# Patient Record
Sex: MALE | Race: WHITE | NOT HISPANIC OR LATINO | Employment: UNEMPLOYED | URBAN - METROPOLITAN AREA
[De-identification: names, ages, dates, MRNs, and addresses within clinical notes are randomized per-mention and may not be internally consistent; named-entity substitution may affect disease eponyms.]

---

## 2017-08-07 ENCOUNTER — HOSPITAL ENCOUNTER (OUTPATIENT)
Facility: HOSPITAL | Age: 72
Discharge: HOME | End: 2017-08-08
Admitting: INTERNAL MEDICINE
Payer: MEDICARE

## 2017-08-07 DIAGNOSIS — R55 SYNCOPE AND COLLAPSE: ICD-10-CM

## 2017-08-07 DIAGNOSIS — I10 HYPERTENSION, ESSENTIAL: Primary | ICD-10-CM

## 2017-08-07 DIAGNOSIS — R07.9 CHEST PAIN: ICD-10-CM

## 2017-08-07 PROBLEM — Z91.89 AT RISK FOR ABNORMAL BLOOD GLUCOSE LEVEL: Status: ACTIVE | Noted: 2017-08-07

## 2017-08-07 PROBLEM — R51.9 HEADACHE: Status: ACTIVE | Noted: 2017-08-07

## 2017-08-07 LAB
ALBUMIN SERPL BCP-MCNC: 3.9 G/DL
ALP SERPL-CCNC: 163 U/L
ALT SERPL W/O P-5'-P-CCNC: 39 U/L
ANION GAP SERPL CALC-SCNC: 10 MMOL/L
AST SERPL-CCNC: 31 U/L
BASOPHILS # BLD AUTO: 0.03 K/UL
BASOPHILS NFR BLD: 0.3 %
BILIRUB SERPL-MCNC: 0.6 MG/DL
BNP SERPL-MCNC: 13 PG/ML
BUN SERPL-MCNC: 16 MG/DL
CALCIUM SERPL-MCNC: 9.9 MG/DL
CHLORIDE SERPL-SCNC: 107 MMOL/L
CHOLEST/HDLC SERPL: 6.2 {RATIO}
CO2 SERPL-SCNC: 22 MMOL/L
CREAT SERPL-MCNC: 1 MG/DL
DIASTOLIC DYSFUNCTION: NO
DIFFERENTIAL METHOD: ABNORMAL
EOSINOPHIL # BLD AUTO: 0.1 K/UL
EOSINOPHIL NFR BLD: 1 %
ERYTHROCYTE [DISTWIDTH] IN BLOOD BY AUTOMATED COUNT: 12.9 %
EST. GFR  (AFRICAN AMERICAN): >60 ML/MIN/1.73 M^2
EST. GFR  (NON AFRICAN AMERICAN): >60 ML/MIN/1.73 M^2
ESTIMATED PA SYSTOLIC PRESSURE: 16.65
GLOBAL PERICARDIAL EFFUSION: NORMAL
GLUCOSE SERPL-MCNC: 146 MG/DL
HCT VFR BLD AUTO: 46.9 %
HDL/CHOLESTEROL RATIO: 16.2 %
HDLC SERPL-MCNC: 173 MG/DL
HDLC SERPL-MCNC: 28 MG/DL
HGB BLD-MCNC: 16.1 G/DL
LDLC SERPL CALC-MCNC: 114.4 MG/DL
LIPASE SERPL-CCNC: 30 U/L
LYMPHOCYTES # BLD AUTO: 1.1 K/UL
LYMPHOCYTES NFR BLD: 11.7 %
MCH RBC QN AUTO: 31.7 PG
MCHC RBC AUTO-ENTMCNC: 34.3 G/DL
MCV RBC AUTO: 92 FL
MITRAL VALVE MOBILITY: NORMAL
MONOCYTES # BLD AUTO: 0.6 K/UL
MONOCYTES NFR BLD: 6.2 %
NEUTROPHILS # BLD AUTO: 7.5 K/UL
NEUTROPHILS NFR BLD: 80.8 %
NONHDLC SERPL-MCNC: 145 MG/DL
PLATELET # BLD AUTO: 200 K/UL
PMV BLD AUTO: 9.9 FL
POCT GLUCOSE: 130 MG/DL (ref 70–110)
POCT GLUCOSE: 154 MG/DL (ref 70–110)
POTASSIUM SERPL-SCNC: 4.4 MMOL/L
PROT SERPL-MCNC: 7.7 G/DL
RBC # BLD AUTO: 5.08 M/UL
RETIRED EF AND QEF - SEE NOTES: 55 (ref 55–65)
SODIUM SERPL-SCNC: 139 MMOL/L
TRIGL SERPL-MCNC: 153 MG/DL
TROPONIN I SERPL DL<=0.01 NG/ML-MCNC: 0.01 NG/ML
TROPONIN I SERPL DL<=0.01 NG/ML-MCNC: <0.006 NG/ML
TROPONIN I SERPL DL<=0.01 NG/ML-MCNC: <0.006 NG/ML
WBC # BLD AUTO: 9.3 K/UL

## 2017-08-07 PROCEDURE — 25000003 PHARM REV CODE 250

## 2017-08-07 PROCEDURE — A4216 STERILE WATER/SALINE, 10 ML: HCPCS

## 2017-08-07 PROCEDURE — G0378 HOSPITAL OBSERVATION PER HR: HCPCS

## 2017-08-07 PROCEDURE — 85025 COMPLETE CBC W/AUTO DIFF WBC: CPT

## 2017-08-07 PROCEDURE — 93306 TTE W/DOPPLER COMPLETE: CPT

## 2017-08-07 PROCEDURE — 36415 COLL VENOUS BLD VENIPUNCTURE: CPT

## 2017-08-07 PROCEDURE — 96372 THER/PROPH/DIAG INJ SC/IM: CPT

## 2017-08-07 PROCEDURE — 84484 ASSAY OF TROPONIN QUANT: CPT

## 2017-08-07 PROCEDURE — 93306 TTE W/DOPPLER COMPLETE: CPT | Mod: 26,,, | Performed by: INTERNAL MEDICINE

## 2017-08-07 PROCEDURE — 63600175 PHARM REV CODE 636 W HCPCS: Performed by: NURSE PRACTITIONER

## 2017-08-07 PROCEDURE — 82962 GLUCOSE BLOOD TEST: CPT

## 2017-08-07 PROCEDURE — 83880 ASSAY OF NATRIURETIC PEPTIDE: CPT

## 2017-08-07 PROCEDURE — 25000003 PHARM REV CODE 250: Performed by: NURSE PRACTITIONER

## 2017-08-07 PROCEDURE — 99285 EMERGENCY DEPT VISIT HI MDM: CPT | Mod: 25

## 2017-08-07 PROCEDURE — 93005 ELECTROCARDIOGRAM TRACING: CPT

## 2017-08-07 PROCEDURE — 84484 ASSAY OF TROPONIN QUANT: CPT | Mod: 91

## 2017-08-07 PROCEDURE — 99220 PR INITIAL OBSERVATION CARE,LEVL III: CPT | Mod: ,,, | Performed by: INTERNAL MEDICINE

## 2017-08-07 PROCEDURE — 80061 LIPID PANEL: CPT

## 2017-08-07 PROCEDURE — 80053 COMPREHEN METABOLIC PANEL: CPT

## 2017-08-07 PROCEDURE — 83690 ASSAY OF LIPASE: CPT

## 2017-08-07 RX ORDER — ACETAMINOPHEN 325 MG/1
650 TABLET ORAL EVERY 6 HOURS PRN
Status: DISCONTINUED | OUTPATIENT
Start: 2017-08-07 | End: 2017-08-08 | Stop reason: HOSPADM

## 2017-08-07 RX ORDER — ACETAMINOPHEN 500 MG
1000 TABLET ORAL
Status: COMPLETED | OUTPATIENT
Start: 2017-08-07 | End: 2017-08-07

## 2017-08-07 RX ORDER — GABAPENTIN 300 MG/1
600 CAPSULE ORAL 3 TIMES DAILY
COMMUNITY

## 2017-08-07 RX ORDER — IBUPROFEN 400 MG/1
800 TABLET ORAL EVERY 6 HOURS PRN
Status: DISCONTINUED | OUTPATIENT
Start: 2017-08-07 | End: 2017-08-08 | Stop reason: HOSPADM

## 2017-08-07 RX ORDER — GABAPENTIN 300 MG/1
300 CAPSULE ORAL 3 TIMES DAILY
Status: DISCONTINUED | OUTPATIENT
Start: 2017-08-07 | End: 2017-08-08 | Stop reason: HOSPADM

## 2017-08-07 RX ORDER — BUTALBITAL, ACETAMINOPHEN AND CAFFEINE 50; 325; 40 MG/1; MG/1; MG/1
1 TABLET ORAL EVERY 6 HOURS PRN
Status: DISCONTINUED | OUTPATIENT
Start: 2017-08-07 | End: 2017-08-08 | Stop reason: HOSPADM

## 2017-08-07 RX ORDER — SODIUM CHLORIDE 0.9 % (FLUSH) 0.9 %
3 SYRINGE (ML) INJECTION EVERY 8 HOURS
Status: DISCONTINUED | OUTPATIENT
Start: 2017-08-07 | End: 2017-08-08 | Stop reason: HOSPADM

## 2017-08-07 RX ORDER — IBUPROFEN 400 MG/1
800 TABLET ORAL EVERY 6 HOURS PRN
Status: DISCONTINUED | OUTPATIENT
Start: 2017-08-07 | End: 2017-08-07

## 2017-08-07 RX ORDER — LISINOPRIL 20 MG/1
40 TABLET ORAL DAILY
Status: DISCONTINUED | OUTPATIENT
Start: 2017-08-08 | End: 2017-08-08 | Stop reason: HOSPADM

## 2017-08-07 RX ORDER — LISINOPRIL 40 MG/1
40 TABLET ORAL DAILY
COMMUNITY

## 2017-08-07 RX ORDER — NITROGLYCERIN 0.4 MG/1
0.4 TABLET SUBLINGUAL EVERY 5 MIN PRN
Status: DISCONTINUED | OUTPATIENT
Start: 2017-08-07 | End: 2017-08-08 | Stop reason: HOSPADM

## 2017-08-07 RX ORDER — ENOXAPARIN SODIUM 100 MG/ML
40 INJECTION SUBCUTANEOUS EVERY 24 HOURS
Status: DISCONTINUED | OUTPATIENT
Start: 2017-08-07 | End: 2017-08-08 | Stop reason: HOSPADM

## 2017-08-07 RX ADMIN — ACETAMINOPHEN 1000 MG: 500 TABLET ORAL at 11:08

## 2017-08-07 RX ADMIN — GABAPENTIN 300 MG: 300 CAPSULE ORAL at 09:08

## 2017-08-07 RX ADMIN — BUTALBITAL, ACETAMINOPHEN AND CAFFEINE 1 TABLET: 50; 325; 40 TABLET ORAL at 05:08

## 2017-08-07 RX ADMIN — ENOXAPARIN SODIUM 40 MG: 100 INJECTION SUBCUTANEOUS at 05:08

## 2017-08-07 RX ADMIN — SODIUM CHLORIDE, PRESERVATIVE FREE 3 ML: 5 INJECTION INTRAVENOUS at 09:08

## 2017-08-07 NOTE — HPI
"71 y.o. male with history including HTN and Hard of hearing. Per ED doc report the patient has had multiple recurrent syncopal episodes to the point where he damaged his glasses. Most recent today, in early morning he felt woozier went to the bathroom to have a bowel movement after which he stood up and felt like a dimmer switch went off.   He has had multiple episodes. Once several months ago whereas he lost consciousness. He saw his primary care doctor who thought he had a stomach virus.He denies confusion, foaming at the mouth, fever, chills, changes in bowel or bladder functions, sick contacts, or long trips. Denies history of smoking, father  of heart attack. Patient had neck surgery after crushed vertebra fall at work .    On my interveiw the pt describes several epsiodes of LOC, last occurring approx 2 months ago (which sounds orthostatic in nature).  More recently has had had abd bloating and gas with associated feeling of "a dimmer switch being turned off."  He is currently asymptomatic.  He did not pass out prior to this admission.  "

## 2017-08-07 NOTE — ED NOTES
JESSICA Avalos notified that pt. Refused MRI. NP ordered ativan, pt. Refused ativan and MRI. Pt. Stated that he would rather not have the procedure. NP instructed RN to cancel MRI if pt. Refused.
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
## 2017-08-07 NOTE — HPI
Ramesh Purcell is a 71 y.o. male with history including HTN and Hard of hearing. Per ED doc report the patient has had multiple recurrent syncopal episodes to the point where he damaged his glasses. Most recent today, in early morning he felt woozier went to the bathroom to have a bowel movement after which he stood up and felt like a dimmer switch went off.     He has had multiple episodes. Once several months ago whereas he lost consciousness. He saw his primary care doctor who thought he had a stomach virus.He denies confusion, foaming at the mouth, fever, chills, changes in bowel or bladder functions, sick contacts, or long trips. Denies history of smoking, father  of heart attack. Patient had neck surgery after crushed vertebra fall at work .

## 2017-08-07 NOTE — ASSESSMENT & PLAN NOTE
Basic labs mostly unrevealing except for glucose 146 and alk phos 163 - his initial troponin 1 level  Is normal, BNP negative; CT head and CXR negative. Kidney functions normal, AST/ALT normal -   -IP consult to cardiology r/o arrythmias  -Continuous cardiac monitoring  -trend troponin 1 levels  -Orthostatic BP  -Neuro checks  -2d echo  -MRA head & neck  -Cardiology consult r/o arrythmias  -Neurology consult r/o compression
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
## 2017-08-07 NOTE — ASSESSMENT & PLAN NOTE
Basic labs mostly unrevealing except for glucose 146 and alk phos 163 - his initial troponin 1 level  Is normal, BNP negative; CT head and CXR negative. Kidney functions normal, AST/ALT normal -   -IP consult to cardiology r/o arrythmias  -Continuous cardiac monitoring  -trend troponin 1 levels  -Orthostatic BP  -Neuro checks  -2d echo  -us carotid  -Cardiology consult r/o arrythmias  -Neurology consult r/o compression
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
## 2017-08-07 NOTE — PROVIDER PROGRESS NOTES - EMERGENCY DEPT.
Encounter Date: 8/7/2017    ED Physician Progress Notes        Physician Note:   Chest x-ray interpreted by me without acute infiltrates no cardiomegaly
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
## 2017-08-07 NOTE — ED TRIAGE NOTES
C/o generalized abd pain that started in the epigastric region and moved downward. Relieved with gas and having a BM. States this has been going on for months, intermittent. He feels weak/dizzy when the pain is the worst. Syncopal episode a few weeks ago.

## 2017-08-07 NOTE — PROVIDER PROGRESS NOTES - EMERGENCY DEPT.
Encounter Date: 8/7/2017    ED Physician Progress Notes         Bailey Scott observation consult cardiology

## 2017-08-07 NOTE — ASSESSMENT & PLAN NOTE
Basic labs mostly unrevealing except for glucose 146 and alk phos 163 - his initial troponin 1 level  Is normal, BNP negative; CT head and CXR negative. Kidney functions normal, AST/ALT normal  -IP consult to cardiology r/o arrythmias  -Continuous cardiac monitoring  -trend troponin 1 levels  -Orthostatic BP  -Neuro checks  -2d echo

## 2017-08-08 VITALS
DIASTOLIC BLOOD PRESSURE: 70 MMHG | BODY MASS INDEX: 36.56 KG/M2 | SYSTOLIC BLOOD PRESSURE: 127 MMHG | HEART RATE: 79 BPM | OXYGEN SATURATION: 95 % | HEIGHT: 71 IN | TEMPERATURE: 98 F | RESPIRATION RATE: 18 BRPM | WEIGHT: 261.13 LBS

## 2017-08-08 LAB
ALBUMIN SERPL BCP-MCNC: 3.7 G/DL
ALP SERPL-CCNC: 145 U/L
ALT SERPL W/O P-5'-P-CCNC: 28 U/L
ANION GAP SERPL CALC-SCNC: 7 MMOL/L
AST SERPL-CCNC: 24 U/L
BASOPHILS # BLD AUTO: 0.04 K/UL
BASOPHILS NFR BLD: 0.6 %
BILIRUB SERPL-MCNC: 0.7 MG/DL
BUN SERPL-MCNC: 16 MG/DL
CALCIUM SERPL-MCNC: 9.6 MG/DL
CHLORIDE SERPL-SCNC: 106 MMOL/L
CO2 SERPL-SCNC: 29 MMOL/L
CREAT SERPL-MCNC: 1.1 MG/DL
DIFFERENTIAL METHOD: ABNORMAL
EOSINOPHIL # BLD AUTO: 0.2 K/UL
EOSINOPHIL NFR BLD: 2.3 %
ERYTHROCYTE [DISTWIDTH] IN BLOOD BY AUTOMATED COUNT: 13 %
EST. GFR  (AFRICAN AMERICAN): >60 ML/MIN/1.73 M^2
EST. GFR  (NON AFRICAN AMERICAN): >60 ML/MIN/1.73 M^2
GLUCOSE SERPL-MCNC: 180 MG/DL
HCT VFR BLD AUTO: 46.8 %
HGB BLD-MCNC: 16 G/DL
LYMPHOCYTES # BLD AUTO: 1.3 K/UL
LYMPHOCYTES NFR BLD: 17.9 %
MCH RBC QN AUTO: 31.9 PG
MCHC RBC AUTO-ENTMCNC: 34.2 G/DL
MCV RBC AUTO: 93 FL
MONOCYTES # BLD AUTO: 0.6 K/UL
MONOCYTES NFR BLD: 8.3 %
NEUTROPHILS # BLD AUTO: 5.1 K/UL
NEUTROPHILS NFR BLD: 70.9 %
PLATELET # BLD AUTO: 186 K/UL
PMV BLD AUTO: 9.9 FL
POCT GLUCOSE: 100 MG/DL (ref 70–110)
POCT GLUCOSE: 140 MG/DL (ref 70–110)
POTASSIUM SERPL-SCNC: 4.2 MMOL/L
PROT SERPL-MCNC: 7.3 G/DL
RBC # BLD AUTO: 5.01 M/UL
SODIUM SERPL-SCNC: 142 MMOL/L
TROPONIN I SERPL DL<=0.01 NG/ML-MCNC: 0.01 NG/ML
WBC # BLD AUTO: 7.25 K/UL

## 2017-08-08 PROCEDURE — 85025 COMPLETE CBC W/AUTO DIFF WBC: CPT

## 2017-08-08 PROCEDURE — 84484 ASSAY OF TROPONIN QUANT: CPT

## 2017-08-08 PROCEDURE — 99226 PR SUBSEQUENT OBSERVATION CARE,LEVEL III: CPT | Mod: ,,, | Performed by: INTERNAL MEDICINE

## 2017-08-08 PROCEDURE — 25000003 PHARM REV CODE 250

## 2017-08-08 PROCEDURE — 80053 COMPREHEN METABOLIC PANEL: CPT

## 2017-08-08 PROCEDURE — A4216 STERILE WATER/SALINE, 10 ML: HCPCS

## 2017-08-08 PROCEDURE — G0378 HOSPITAL OBSERVATION PER HR: HCPCS

## 2017-08-08 PROCEDURE — 36415 COLL VENOUS BLD VENIPUNCTURE: CPT

## 2017-08-08 RX ORDER — ATORVASTATIN CALCIUM 10 MG/1
20 TABLET, FILM COATED ORAL NIGHTLY
Status: DISCONTINUED | OUTPATIENT
Start: 2017-08-08 | End: 2017-08-08 | Stop reason: HOSPADM

## 2017-08-08 RX ORDER — ATORVASTATIN CALCIUM 20 MG/1
20 TABLET, FILM COATED ORAL NIGHTLY
Qty: 30 TABLET | Refills: 1 | Status: SHIPPED | OUTPATIENT
Start: 2017-08-08 | End: 2018-08-08

## 2017-08-08 RX ADMIN — GABAPENTIN 300 MG: 300 CAPSULE ORAL at 06:08

## 2017-08-08 RX ADMIN — SODIUM CHLORIDE, PRESERVATIVE FREE 3 ML: 5 INJECTION INTRAVENOUS at 06:08

## 2017-08-08 RX ADMIN — LISINOPRIL 40 MG: 20 TABLET ORAL at 09:08
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
## 2017-08-08 NOTE — PLAN OF CARE
08/08/17 1400   Discharge Assessment   Assessment Type Discharge Planning Assessment  (pt discharged prior to completion)

## 2017-08-08 NOTE — ASSESSMENT & PLAN NOTE
Sxs seem vagal in nature.  No recurrence overnight.  No events on tele.  He did not actually lose consciousness this time, although he has had LOC in the past (although those episodes seem more orthostatic/dehydration in nature).  No overt anginal sxs.  EKG/Echo/CE normal.  No plan for inpatient stress test  OK for discharge  Pt to follow up with me in the office for outpatient event monitor

## 2017-08-08 NOTE — PLAN OF CARE
Problem: Fall Risk (Adult)  Goal: Absence of Falls  Patient will demonstrate the desired outcomes by discharge/transition of care.    08/08/17 0249   Fall Risk (Adult)   Absence of Falls making progress toward outcome       Problem: Patient Care Overview  Goal: Plan of Care Review   08/08/17 0249   Coping/Psychosocial   Plan Of Care Reviewed With patient;spouse       Problem: Syncope (Adult)  Intervention: Prevent Injury   08/08/17 0249   Cardiac Interventions   Syncope Management position changed slowly       Goal: Identify Related Risk Factors and Signs and Symptoms  Related risk factors and signs and symptoms are identified upon initiation of Human Response Clinical Practice Guideline (CPG)   08/08/17 0249   Syncope   Signs and Symptoms (Syncope) visual disturbance     Goal: Physical Safety/Health Maintenance  Patient will demonstrate the desired outcomes by discharge/transition of care.   08/08/17 0249   Syncope (Adult)   Physical Safety/Health Maintenance making progress toward outcome     Goal: Optimal Emotional/Functional [place]  Patient will demonstrate the desired outcomes by discharge/transition of care.   08/08/17 0249   Syncope (Adult)   Optimal Emotional/Functional [place] making progress toward outcome

## 2017-08-08 NOTE — NURSING
Discharge instructions given to patient and spouse at bedside. Patient verbalized understanding of instructions. Patient states willingness to comply. Saline lock removed. Tele monitoring removed.

## 2017-08-08 NOTE — HOSPITAL COURSE
Interval Hx: no complaints.  No cp/sob.  No further abd bloating or near syncopal sxs.    Tele: NSR (pers rev)

## 2017-08-08 NOTE — PLAN OF CARE
08/08/17 1507   Final Note   Assessment Type Final Discharge Note   Discharge Disposition Home   Discharge planning education complete? Yes   Hospital Follow Up  Appt(s) scheduled? Yes   Discharge plans and expectations educations in teach back method with documentation complete? Yes   Offered [facility] -- Bedside Delivery? n/a   Discharge/Hospital Encounter Summary to (non-[facility]) PCP n/a   Referral to Outpatient Case Management complete? n/a   Referral to / orders for Home Health Complete? n/a   30 day supply of medicines given at discharge, if documented non-compliance / non-adherence? n/a   Any social issues identified prior to discharge? n/a   Did you assess the readiness or willingness of the family or caregiver to support self management of care? n/a   Right Care Referral Info   Post Acute Recommendation No Care     Follow-up Information     ALBERT Darling On 8/11/2017.    Specialty:  Family Medicine  Why:  Appointment scheduled Friday August 11th at 10:15am  Contact information:  [address]  [place] [facility] [address]  [phone]             Stan Moreno MD On 8/17/2017.    Specialties:  Cardiology, INTERVENTIONAL CARDIOLOGY  Why:  Appoinmtent scheduled for Thursday August 17th at 3pm  Contact information:  [address]  [place] [facility] [address]  [phone]

## 2017-08-08 NOTE — ASSESSMENT & PLAN NOTE
Sxs seem vagal in nature.  He did not actually lose consciousness this time, although he has had LOC in the past (although those episodes seem more orthostatic/dehydration in nature).  No overt anginal sxs.  EKG/Echo/CE normal.  Monitor overnight  No plan for inpatient stress test  Assuming no arrhythmia, OK for discharge in am  Pt to follow up with me in the office for outpatient event monitor

## 2017-08-08 NOTE — NURSING
Report received from CIRA Otero. Patient care assumed. Pt. Observed lying in bed wife spouse at the bedside. Pt. Is AAOx4. Pt. Noted with telemetry monitor in place. Pt. Vital signs stable and found in flow sheets with complete patient assessment. Pt. Skin dry and intact. Pt. Has no complaints of pain and no signs of respiratory distress noted. Pt. Stable. Will continue to monitor.

## 2017-08-08 NOTE — SUBJECTIVE & OBJECTIVE
Past Medical History:   Diagnosis Date    Hypertension     Kidney stones     Peripheral neuropathy        Past Surgical History:   Procedure Laterality Date    CERVICAL SPINE SURGERY         Review of patient's allergies indicates:   Allergen Reactions    Codeine Itching       No current facility-administered medications on file prior to encounter.      No current outpatient prescriptions on file prior to encounter.     Family History     Problem Relation (Age of Onset)    Breast cancer Mother    Colon cancer Mother    Heart disease Father (72)        Social History Main Topics    Smoking status: Never Smoker    Smokeless tobacco: Never Used    Alcohol use No    Drug use: No    Sexual activity: Not on file     Review of Systems   Gastrointestinal: Negative for melena.   Genitourinary: Negative for hematuria.     Objective:     Vital Signs (Most Recent):  Temp: 98.9 °F (37.2 °C) (08/08/17 0510)  Pulse: 77 (08/08/17 0510)  Resp: 18 (08/08/17 0510)  BP: 130/70 (08/08/17 0510)  SpO2: 96 % (08/08/17 0510) Vital Signs (24h Range):  Temp:  [97.7 °F (36.5 °C)-99 °F (37.2 °C)] 98.9 °F (37.2 °C)  Pulse:  [75-86] 77  Resp:  [18-20] 18  SpO2:  [95 %-97 %] 96 %  BP: (124-156)/(57-92) 130/70     Weight: 118.4 kg (261 lb 1.6 oz)  Body mass index is 36.93 kg/m².    SpO2: 96 %  O2 Device (Oxygen Therapy): room air      Intake/Output Summary (Last 24 hours) at 08/08/17 0653  Last data filed at 08/08/17 0511   Gross per 24 hour   Intake              360 ml   Output              750 ml   Net             -390 ml       Lines/Drains/Airways     Peripheral Intravenous Line                 Peripheral IV - Single Lumen 08/07/17 0956 Right Hand less than 1 day                Physical Exam   Constitutional: He is oriented to person, place, and time. He appears well-developed and well-nourished.   HENT:   Head: Normocephalic and atraumatic.   Eyes: Conjunctivae and EOM are normal. Pupils are equal, round, and reactive to light. No  scleral icterus.   Neck: Neck supple. No JVD present. Carotid bruit is not present. No tracheal deviation present. No thyromegaly present.   Cardiovascular: Normal rate, regular rhythm, S1 normal and S2 normal.  Exam reveals no gallop and no friction rub.    No murmur heard.  Pulmonary/Chest: Effort normal and breath sounds normal. He has no wheezes. He exhibits no tenderness.   Abdominal: Soft. Bowel sounds are normal. He exhibits no distension and no mass. There is no tenderness. There is no rebound and no guarding.   Musculoskeletal: He exhibits no edema.   Neurological: He is alert and oriented to person, place, and time. He has normal strength. No cranial nerve deficit.   Skin: Skin is warm and dry. No rash noted.   Psychiatric: He has a normal mood and affect. His behavior is normal.       Current Medications:   enoxaparin  40 mg Subcutaneous Daily    gabapentin  300 mg Oral TID    lisinopril  40 mg Oral Daily    sodium chloride 0.9%  3 mL Intravenous Q8H        acetaminophen, butalbital-acetaminophen-caffeine -40 mg, ibuprofen, lorazepam, nitroGLYCERIN, promethazine (PHENERGAN) IVPB    Laboratory:  CBC:    Recent Labs  Lab 08/07/17  0954   WHITE BLOOD CELL COUNT 9.30   HEMOGLOBIN 16.1   HEMATOCRIT 46.9   PLATELETS 200       CHEMISTRIES:    Recent Labs  Lab 08/07/17  0954   GLUCOSE 146 H   SODIUM 139   POTASSIUM 4.4   BUN BLD 16   CREATININE 1.0   EGFR IF  >60   EGFR IF NON- >60   CALCIUM 9.9       CARDIAC BIOMARKERS:    Recent Labs  Lab 08/07/17  1232 08/07/17  1923 08/08/17  0152   TROPONIN I <0.006 0.006 0.009       COAGS:        LIPIDS/LFTS:    Recent Labs  Lab 08/07/17  0954 08/07/17  1923   CHOLESTEROL  --  173   TRIGLYCERIDES  --  153 H   HDL  --  28 L   LDL CHOLESTEROL  --  114.4   NON-HDL CHOLESTEROL  --  145   AST 31  --    ALT 39  --            Diagnostic Results:  ECG (personally reviewed tracings):   8/7/17 SR 82    Chest X-Ray (personally reviewed  image(s)): 8/7/17 NAD    Echo: 8/7/17 (images pers rev)    1 - TDS.     2 - Normal left ventricular systolic function (EF 55-60%).     3 - No diagnostic regional wall motion abnormalities.     4 - Concentric remodeling.     5 - Mildly enlarged aortic root, 4.1 cm.

## 2017-08-09 NOTE — DISCHARGE SUMMARY
[facility] Hospital Medicine  Discharge Summary      Patient Name: Ramesh Purcell  MRN: [id number]  Admission Date: 2017  Hospital Length of Stay: 0 days  Discharge Date and Time: 2017  3:30 PM  Attending Physician: No att. providers found   Discharging Provider: EARNEST Wu  Primary Care Provider: Marcelino Lazaro III, MD      HPI:   Ramesh Purcell is a 71 y.o. male with history including HTN and Hard of hearing. Per ED doc report the patient has had multiple recurrent syncopal episodes to the point where he damaged his glasses. Most recent today, in early morning he felt woozier went to the bathroom to have a bowel movement after which he stood up and felt like a dimmer switch went off.     He has had multiple episodes. Once several months ago whereas he lost consciousness. He saw his primary care doctor who thought he had a stomach virus.He denies confusion, foaming at the mouth, fever, chills, changes in bowel or bladder functions, sick contacts, or long trips. Denies history of smoking, father  of heart attack. Patient had neck surgery after crushed vertebra fall at work .    * No surgery found *      Indwelling Lines/Drains at time of discharge:   Lines/Drains/Airways          No matching active lines, drains, or airways        Hospital Course:   Patient admitted for evaluation of syncopal episodes with recent work by PCP and was thought that a stomach virus was contributory. Patient is afebrile and without leukocytosis, UA negative. He is not orthostatic. Blood glucose slightly elevated, however, trend to normal range. BP well controlled on current BP medication regimen. CT head was completed and without acute findings.  EKG is non-ischemic and without bradycardiac arrhythmias. CXR without acute findings. Troponin levels and BNP wnl.  Echo completed and shows EF 55% and without evidence of AS or valvular dysfunction. Cardiology consulted  and evaluated and has cleared patient from a CV standpoint as he remained stable overnight and without arrhythmias. Per cardiology, patient to follow-up for event monitor.  He is stable and will discharge to home with instructions to follow-up with PCP in 1 week for continued evaluation.  Syncope discharge instructions provided (educated on position changes and maintaining oral hydration). Instructed to resume cardiac diet. Activity as tolerated.    Of note Lipid panel completed and shows mixed hyperlipidemia, started on low dose Statin.       Consults:   Consults         Status Ordering Provider     Inpatient consult to Cardiology  Once     Provider:  Codey Fall MD    Completed REMY HUSSEIN          Significant Diagnostic Studies: Labs: All labs within the past 24 hours have been reviewed    Pending Diagnostic Studies:     None        Final Active Diagnoses:    Diagnosis Date Noted POA    Syncope and collapse [R55] 08/07/2017 Yes    Hypertension, essential [I10] 08/07/2017 Yes    At risk for abnormal blood glucose level [Z91.89] 08/07/2017 Yes    Headache [R51] 08/07/2017 Yes    Vasovagal near syncope [R55] 08/07/2017 Yes      Problems Resolved During this Admission:    Diagnosis Date Noted Date Resolved POA    PRINCIPAL PROBLEM:  Syncope [R55] 08/07/2017 08/07/2017 Yes      Syncope and collapse    Basic labs mostly unrevealing except for glucose 146 and alk phos 163 - his initial troponin 1 level  Is normal, BNP negative; CT head and CXR negative. Kidney functions normal, AST/ALT normal -   -IP consult to cardiology r/o arrythmias  -Continuous cardiac monitoring  -trend troponin 1 levels  -Orthostatic BP  -Neuro checks  -2d echo  -MRA head & neck  -Cardiology consult r/o arrythmias  -Neurology consult r/o compression            Discharged Condition: good    Disposition: Home or Self Care    Follow Up:  Follow-up Information     ALBERT Darling On 8/11/2017.    Specialty:  Family Medicine  Why:   Appointment scheduled Friday August 11th at 10:15am  Contact information:  [address]  [place] [facility]  [place] [address]  [phone]             Stan Moreno MD On 8/17/2017.    Specialties:  Cardiology, INTERVENTIONAL CARDIOLOGY  Why:  Appoinmtent scheduled for Thursday August 17th at 3pm  Contact information:  [address]  [address]  [place] [address]  [phone]                 Patient Instructions:     Diet general   Order Specific Question Answer Comments   Na restriction, if any: 2gNa      Activity as tolerated     Call MD for:  persistent dizziness, light-headedness, or visual disturbances     Call MD for:  difficulty breathing or increased cough     Call MD for:  severe uncontrolled pain     Call MD for:  severe persistent headache       Medications:  Reconciled Home Medications:   Discharge Medication List as of 8/8/2017  3:19 PM      START taking these medications    Details   atorvastatin (LIPITOR) 20 MG tablet Take 1 tablet (20 mg total) by mouth nightly., Starting Tue 8/8/2017, Until Wed 8/8/2018, Print         CONTINUE these medications which have NOT CHANGED    Details   gabapentin (NEURONTIN) 300 MG capsule Take 600 mg by mouth 3 (three) times daily. , Historical Med      lisinopril (PRINIVIL,ZESTRIL) 40 MG tablet Take 40 mg by mouth once daily., Historical Med           Time spent on the discharge of patient: 30 minutes    HOS POC IP DISCHARGE SUMMARY    EARNEST Wu  Department of Hospital Medicine  [facility]

## 2017-08-09 NOTE — HOSPITAL COURSE
Patient admitted for evaluation of syncopal episodes with recent work by PCP and was thought that a stomach virus was contributory. Patient is afebrile and without leukocytosis, UA negative. He is not orthostatic. Blood glucose slightly elevated, however, trend to normal range. BP well controlled on current BP medication regimen. CT head was completed and without acute findings.  EKG is non-ischemic and without bradycardiac arrhythmias. CXR without acute findings. Troponin levels and BNP wnl.  Echo completed and shows EF 55% and without evidence of AS or valvular dysfunction. Cardiology consulted and evaluated and has cleared patient from a CV standpoint as he remained stable overnight and without arrhythmias. Per cardiology, patient to follow-up for event monitor.  He is stable and will discharge to home with instructions to follow-up with PCP in 1 week for continued evaluation.  Syncope discharge instructions provided (educated on position changes and maintaining oral hydration). Instructed to resume cardiac diet. Activity as tolerated.    Of note Lipid panel completed and shows mixed hyperlipidemia, started on low dose Statin.

## 2017-08-25 ENCOUNTER — HOSPITAL ENCOUNTER (OUTPATIENT)
Dept: RADIOLOGY | Facility: HOSPITAL | Age: 72
Discharge: HOME | End: 2017-08-25
Attending: GENERAL PRACTICE
Payer: MEDICARE

## 2017-08-25 DIAGNOSIS — M79.644 PAIN IN RIGHT FINGER(S): ICD-10-CM

## 2017-08-25 DIAGNOSIS — M79.644 PAIN IN RIGHT FINGER(S): Primary | ICD-10-CM

## 2017-08-25 PROCEDURE — 73130 X-RAY EXAM OF HAND: CPT | Mod: 26,RT,, | Performed by: RADIOLOGY

## 2017-08-25 PROCEDURE — 73130 X-RAY EXAM OF HAND: CPT | Mod: TC,RT

## 2018-01-15 ENCOUNTER — HOSPITAL ENCOUNTER (OUTPATIENT)
Dept: PREADMISSION TESTING | Facility: HOSPITAL | Age: 73
Discharge: HOME | End: 2018-01-15
Attending: SURGERY
Payer: MEDICARE

## 2018-01-15 VITALS
OXYGEN SATURATION: 97 % | WEIGHT: 257.38 LBS | TEMPERATURE: 98 F | HEART RATE: 76 BPM | RESPIRATION RATE: 17 BRPM | SYSTOLIC BLOOD PRESSURE: 114 MMHG | BODY MASS INDEX: 36.03 KG/M2 | DIASTOLIC BLOOD PRESSURE: 67 MMHG | HEIGHT: 71 IN

## 2018-01-15 DIAGNOSIS — Z01.818 PRE-OP TESTING: Primary | ICD-10-CM

## 2018-01-15 LAB
ANION GAP SERPL CALC-SCNC: 7 MMOL/L
BASOPHILS # BLD AUTO: 0.05 K/UL
BASOPHILS NFR BLD: 0.6 %
BUN SERPL-MCNC: 14 MG/DL
CALCIUM SERPL-MCNC: 9.7 MG/DL
CHLORIDE SERPL-SCNC: 105 MMOL/L
CO2 SERPL-SCNC: 30 MMOL/L
CREAT SERPL-MCNC: 1 MG/DL
DIFFERENTIAL METHOD: ABNORMAL
EOSINOPHIL # BLD AUTO: 0.1 K/UL
EOSINOPHIL NFR BLD: 1.3 %
ERYTHROCYTE [DISTWIDTH] IN BLOOD BY AUTOMATED COUNT: 13.3 %
EST. GFR  (AFRICAN AMERICAN): >60 ML/MIN/1.73 M^2
EST. GFR  (NON AFRICAN AMERICAN): >60 ML/MIN/1.73 M^2
GLUCOSE SERPL-MCNC: 103 MG/DL
HCT VFR BLD AUTO: 46.7 %
HGB BLD-MCNC: 15.9 G/DL
LYMPHOCYTES # BLD AUTO: 1.6 K/UL
LYMPHOCYTES NFR BLD: 18.3 %
MCH RBC QN AUTO: 31.4 PG
MCHC RBC AUTO-ENTMCNC: 34 G/DL
MCV RBC AUTO: 92 FL
MONOCYTES # BLD AUTO: 1 K/UL
MONOCYTES NFR BLD: 11.5 %
NEUTROPHILS # BLD AUTO: 6.1 K/UL
NEUTROPHILS NFR BLD: 68.1 %
PLATELET # BLD AUTO: 239 K/UL
PMV BLD AUTO: 10.2 FL
POTASSIUM SERPL-SCNC: 4.9 MMOL/L
RBC # BLD AUTO: 5.06 M/UL
SODIUM SERPL-SCNC: 142 MMOL/L
WBC # BLD AUTO: 8.92 K/UL

## 2018-01-15 PROCEDURE — 36415 COLL VENOUS BLD VENIPUNCTURE: CPT

## 2018-01-15 PROCEDURE — 85025 COMPLETE CBC W/AUTO DIFF WBC: CPT

## 2018-01-15 PROCEDURE — 80048 BASIC METABOLIC PNL TOTAL CA: CPT

## 2018-01-15 RX ORDER — ACETAMINOPHEN 500 MG
1000 TABLET ORAL EVERY 6 HOURS PRN
Status: ON HOLD | COMMUNITY
End: 2018-01-18 | Stop reason: HOSPADM

## 2018-01-15 RX ORDER — IBUPROFEN 200 MG
400 TABLET ORAL EVERY 6 HOURS PRN
Status: ON HOLD | COMMUNITY
End: 2018-01-18 | Stop reason: HOSPADM

## 2018-01-15 NOTE — PRE-PROCEDURE INSTRUCTIONS
Spoke with Dr. Siegel regarding EKG and X-Ray from August   Both OK.  Informed him of patient recent bout with an Intestinal virus with vomiting about 3 days ago.  Patient had heavy heaving when vomiting and now is having chest muscle pain when he coughs.  Denies shortness of breath. Patient has been eating for the pass 2 days.  Patient instructed to notify MD if Virus returns and any further increase in chest muscle pain.  Patient and wife verbalize understanding.

## 2018-01-15 NOTE — DISCHARGE INSTRUCTIONS
Your surgery is scheduled for_  Thursday January 18, 2018________________.    Call [phone] between 2 pm and 5 pm _WEDNESDAY 17, 2018________ to find out your arrival time for the day of surgery.      Report to SAME DAY SURGERY UNIT at _______AM   on the 2nd floor of the hospital.  Use the front entrance of the hospital before 6 am.                                          I    Important instructions:   Do not eat or drink after 12 midnight, including water.  It is okay to brush your teeth.  Do not have gum, candy or mints.      Take   GABAPENTIN  AND  RANITIDINE   WITH A SIP OF WATER THE AM OF SURGERY                     Prep instructions:   SHOWER   OTHER_____________      Please shower the night before and the morning of your surgery.       Use Hibiclens soap to your surgery site if instructed by your pre op nurse.   If your surgery is on your abdomen, be sure to wash your naval( BELLY BUTTON).    Be sure to rinse off Hibiclens after it is on your skin for several minutes.  Do not use Hibiclens on your face or genitals.     You may wear deodorant only.     Do not wear powder, body lotion or cologne.     If you are going home on the same day of surgery, you must have arrangements for a ride home.  You will not be able to drive home if you were given anesthesia or sedation.       Stop taking Aspirin, Ibuprofen, Motrin and Aleve at least 3-5 days before your surgery.                                                                                                                                                                                                               May take Tylenol / Acetaminophen  If needed     Wear loose fitting clothes allowing for bandages.     Please leave money and valuables home.        You may bring your cell phone.     Call the doctor if fever or illness should occur before your surgery.    Call [phone] to contact us here at Pre Op Center if needed.

## 2018-01-16 ENCOUNTER — HOSPITAL ENCOUNTER (EMERGENCY)
Facility: HOSPITAL | Age: 73
Discharge: HOME | End: 2018-01-16
Attending: EMERGENCY MEDICINE
Payer: MEDICARE

## 2018-01-16 VITALS
HEART RATE: 78 BPM | BODY MASS INDEX: 35.98 KG/M2 | SYSTOLIC BLOOD PRESSURE: 143 MMHG | TEMPERATURE: 99 F | DIASTOLIC BLOOD PRESSURE: 87 MMHG | OXYGEN SATURATION: 98 % | RESPIRATION RATE: 18 BRPM | WEIGHT: 257 LBS | HEIGHT: 71 IN

## 2018-01-16 DIAGNOSIS — R07.9 CHEST PAIN: ICD-10-CM

## 2018-01-16 DIAGNOSIS — R10.9 ABDOMINAL PAIN: ICD-10-CM

## 2018-01-16 PROBLEM — K40.90 HERNIA, INGUINAL, LEFT: Status: ACTIVE | Noted: 2018-01-16

## 2018-01-16 LAB
ALBUMIN SERPL BCP-MCNC: 3.8 G/DL
ALP SERPL-CCNC: 128 U/L
ALT SERPL W/O P-5'-P-CCNC: 43 U/L
ANION GAP SERPL CALC-SCNC: 8 MMOL/L
AST SERPL-CCNC: 27 U/L
BASOPHILS # BLD AUTO: 0.04 K/UL
BASOPHILS NFR BLD: 0.5 %
BILIRUB SERPL-MCNC: 0.7 MG/DL
BNP SERPL-MCNC: <10 PG/ML
BUN SERPL-MCNC: 14 MG/DL
CALCIUM SERPL-MCNC: 9.5 MG/DL
CHLORIDE SERPL-SCNC: 106 MMOL/L
CO2 SERPL-SCNC: 27 MMOL/L
CREAT SERPL-MCNC: 1 MG/DL
DIFFERENTIAL METHOD: ABNORMAL
EOSINOPHIL # BLD AUTO: 0 K/UL
EOSINOPHIL NFR BLD: 0.5 %
ERYTHROCYTE [DISTWIDTH] IN BLOOD BY AUTOMATED COUNT: 13.1 %
EST. GFR  (AFRICAN AMERICAN): >60 ML/MIN/1.73 M^2
EST. GFR  (NON AFRICAN AMERICAN): >60 ML/MIN/1.73 M^2
GLUCOSE SERPL-MCNC: 140 MG/DL
HCT VFR BLD AUTO: 47.5 %
HGB BLD-MCNC: 16 G/DL
LIPASE SERPL-CCNC: 30 U/L
LYMPHOCYTES # BLD AUTO: 0.9 K/UL
LYMPHOCYTES NFR BLD: 11.8 %
MCH RBC QN AUTO: 30.6 PG
MCHC RBC AUTO-ENTMCNC: 33.7 G/DL
MCV RBC AUTO: 91 FL
MONOCYTES # BLD AUTO: 0.7 K/UL
MONOCYTES NFR BLD: 9.2 %
NEUTROPHILS # BLD AUTO: 5.8 K/UL
NEUTROPHILS NFR BLD: 77.9 %
PLATELET # BLD AUTO: 199 K/UL
PMV BLD AUTO: 9.9 FL
POTASSIUM SERPL-SCNC: 4.1 MMOL/L
PROT SERPL-MCNC: 7 G/DL
RBC # BLD AUTO: 5.23 M/UL
SODIUM SERPL-SCNC: 141 MMOL/L
TROPONIN I SERPL DL<=0.01 NG/ML-MCNC: 0.01 NG/ML
TROPONIN I SERPL DL<=0.01 NG/ML-MCNC: <0.006 NG/ML
WBC # BLD AUTO: 7.48 K/UL

## 2018-01-16 PROCEDURE — 80053 COMPREHEN METABOLIC PANEL: CPT

## 2018-01-16 PROCEDURE — 25500020 PHARM REV CODE 255: Performed by: EMERGENCY MEDICINE

## 2018-01-16 PROCEDURE — 83880 ASSAY OF NATRIURETIC PEPTIDE: CPT

## 2018-01-16 PROCEDURE — 99285 EMERGENCY DEPT VISIT HI MDM: CPT

## 2018-01-16 PROCEDURE — 83690 ASSAY OF LIPASE: CPT

## 2018-01-16 PROCEDURE — 93005 ELECTROCARDIOGRAM TRACING: CPT

## 2018-01-16 PROCEDURE — 93010 ELECTROCARDIOGRAM REPORT: CPT | Mod: ,,, | Performed by: INTERNAL MEDICINE

## 2018-01-16 PROCEDURE — 84484 ASSAY OF TROPONIN QUANT: CPT | Mod: 91

## 2018-01-16 PROCEDURE — 85025 COMPLETE CBC W/AUTO DIFF WBC: CPT

## 2018-01-16 RX ORDER — DICYCLOMINE HYDROCHLORIDE 20 MG/1
20 TABLET ORAL 2 TIMES DAILY
Qty: 20 TABLET | Refills: 0 | Status: SHIPPED | OUTPATIENT
Start: 2018-01-16 | End: 2018-02-15

## 2018-01-16 RX ORDER — PANTOPRAZOLE SODIUM 20 MG/1
20 TABLET, DELAYED RELEASE ORAL DAILY
Qty: 30 TABLET | Refills: 0 | Status: SHIPPED | OUTPATIENT
Start: 2018-01-16 | End: 2018-07-25 | Stop reason: ALTCHOICE

## 2018-01-16 RX ADMIN — IOHEXOL 100 ML: 350 INJECTION, SOLUTION INTRAVENOUS at 11:01

## 2018-01-16 RX ADMIN — IOHEXOL 15 ML: 300 INJECTION, SOLUTION INTRAVENOUS at 11:01

## 2018-01-16 NOTE — ED TRIAGE NOTES
Pt arrived to Ed due to epigastric abdominal pain that started this morning. Pt reports burping after taking antacid, which help to alleviate pain. Pt reports dizziness at this time.Wife reports pt having dry heaving which resulted in Chest pain. Pt reports pt suppose to have surgery on Thursday due to hiatal hernia.

## 2018-01-16 NOTE — DISCHARGE INSTRUCTIONS
You were seen in the emergency department for abdominal pain.  Your x-ray, labs, EKG, and CT scan were reassuring.  Your CT scan reveals you have a small renal cyst.  These usually do not cause any issues, however please discuss with your primary care provider. Please return immediately for any new or worsening pain, nausea, vomiting, coughing up blood, black or bloody stool, difficulty breathing, or other new or worsening concerns. We have given you a medication to help with abdominal cramping as well as a different antacid you can try.

## 2018-01-16 NOTE — ED PROVIDER NOTES
"Encounter Date: 1/16/2018    SCRIBE #1 NOTE: I, Kianna Marion, am scribing for, and in the presence of,  Ramesh Marley MD. I have scribed the following portions of the note - Other sections scribed: ROS and HPI.       History     Chief Complaint   Patient presents with    Abdominal Pain     morning woke up with epigastric pain and had difficulty taking a deep breath in. reports he burped and the pain went away. "It was very scary so I called the ambulance"     CC: Abdominal Pain  HPI: This 72 y.o. male with a past medical history of Arthritis; Hypertension; Kidney stones; and Peripheral neuropathy, presents to the ED via EMS complaining of generalized intermittent abdominal pain for last 2-3 months. He reports pain wasworse today lasted for 30 minutes. Notes feeling cold and clammy. Denies profuse sweating. He reports pain was due to stomach gas build- up and with severe pressure migrating down in his stomach. Pressure is relieved suddenly with belching. Denies emesis. He notes x3 BM in last 2 days. Denies black/tarry stool. He reports dry heaving makes his abdominal and chest muscles to hurt for couple of hours. He denies actual chest pain. He notes he currently feels weak. Denies hx of heart problems. He has a hernia repair surgery scheduled with Dr. Martini for day after tomorrow. He reports normal pre-op yesterday. He states hernia appears only with longer standing and walking. He denies any pain associated with his hernia. He notes his abdominal pain is unrelated to his hernia. The patient's spouse notes patient has lost 30 lbs unintentionally in last 6 months.       The history is provided by the patient and the spouse. No  was used.     Review of patient's allergies indicates:   Allergen Reactions    Codeine Itching     Past Medical History:   Diagnosis Date    Arthritis     Hypertension     Kidney stones     Peripheral neuropathy      Past Surgical History:   Procedure " Laterality Date    CERVICAL SPINE SURGERY      SHARPNEL      REMOVED FROM  RIGHT LEG     Family History   Problem Relation Age of Onset    Breast cancer Mother     Colon cancer Mother     Heart disease Father 72     MI     Social History   Substance Use Topics    Smoking status: Never Smoker    Smokeless tobacco: Never Used    Alcohol use No     Review of Systems   Constitutional: Negative for chills and fever.   HENT: Negative for rhinorrhea and sore throat.    Eyes: Negative for redness.   Respiratory: Negative for cough.    Cardiovascular: Positive for chest pain (w/dry heaving).   Gastrointestinal: Positive for abdominal pain (generalized without radiation to back and/or groin). Negative for abdominal distention, anal bleeding, blood in stool, constipation, diarrhea, nausea and vomiting.        (+) retching,  (+) belching, (+) Flatulence    Genitourinary: Negative for difficulty urinating, discharge, dysuria, enuresis, flank pain, frequency, hematuria, penile pain, penile swelling, scrotal swelling and testicular pain.   Musculoskeletal: Negative for back pain.   Skin: Negative for color change.   Neurological: Negative for syncope, numbness and headaches.   Psychiatric/Behavioral: The patient is not nervous/anxious.        Physical Exam     Initial Vitals [01/16/18 0833]   BP Pulse Resp Temp SpO2   139/71 76 18 98.2 °F (36.8 °C) 97 %      MAP       93.67         Physical Exam    Nursing note and vitals reviewed.  Constitutional: He appears well-developed and well-nourished. He is not diaphoretic. No distress.   HENT:   Head: Normocephalic and atraumatic.   Eyes: Conjunctivae and EOM are normal. Pupils are equal, round, and reactive to light.   Neck: Neck supple. No thyromegaly present.   Cardiovascular: Normal rate and regular rhythm. Exam reveals no gallop and no friction rub.    No murmur heard.  Pulmonary/Chest: Breath sounds normal. No respiratory distress. He has no wheezes. He has no rhonchi. He  has no rales.   Abdominal: Soft. Normal appearance and bowel sounds are normal. He exhibits no distension. There is no tenderness.   The patient has hypoactive bowel sounds.   Musculoskeletal: Normal range of motion. He exhibits no edema or tenderness.   Lymphadenopathy:     He has no cervical adenopathy.   Neurological: He is alert and oriented to person, place, and time. No cranial nerve deficit or sensory deficit.   Skin: Skin is warm and dry.   Psychiatric: He has a normal mood and affect. Thought content normal.         ED Course   Procedures  Labs Reviewed   CBC W/ AUTO DIFFERENTIAL - Abnormal; Notable for the following:        Result Value    Lymph # 0.9 (*)     Gran% 77.9 (*)     Lymph% 11.8 (*)     All other components within normal limits   COMPREHENSIVE METABOLIC PANEL - Abnormal; Notable for the following:     Glucose 140 (*)     All other components within normal limits   TROPONIN I   B-TYPE NATRIURETIC PEPTIDE   LIPASE   TROPONIN I     EKG Readings: (Independently Interpreted)   Initial Reading: No STEMI. Rhythm: Normal Sinus Rhythm. Heart Rate: 80.          Medical Decision Making:   Initial Assessment:   72-year-old malewith a history of herniaand renal colic presenting with abdominal pain, nausea anddry heaves about 30 minutes earlier today.  States this is been intermittent and ongoing for about a month, however the last day or 2 has been excruciatingly worse.  Became scared due to severity of symptoms this morning and called ambulance.  Denies chest pain, shortness of breath, hemoptysis.  Denies black or bloody stool.  3 bowel movements in 2 days.  Also of note, 30 pound unintentional weight loss over the last 4 months  Differential Diagnosis:   GERD, peptic ulcer disease, ACS, biliary colic, renal colic, small bowel obstruction  ED Management:  Patient pain-free at the moment.  Abdominal exambenign, soft.  No scleral icterus.  We'll get labs, chest x-ray, tropes.  EKG normal.  If all negative,  will consider CT abdomen.  While I have relatively low concern for acute surgical cause, I am somewhat concerned by the significant weight loss and ongoing symptoms.    CT scan negative except for renal cysts, discussed with patient need for PCP f/u.  Trop negative x 2. Heart score <4. Did offer and consider observation given age, but patient declined adamantly stating he felt fine and wanted to go home. I have relatively low concern for ACS or acute intrabdominal pathology. DC home with strict return precautions and f/u with surgery on Friday.            Scribe Attestation:   Scribe #1: I performed the above scribed service and the documentation accurately describes the services I performed. I attest to the accuracy of the note.    Attending Attestation:           Physician Attestation for Scribe:  Physician Attestation Statement for Scribe #1: I, Ramesh Marley MD, reviewed documentation, as scribed by Kianna Marion in my presence, and it is both accurate and complete.                 ED Course      Clinical Impression:   Diagnoses of Chest pain and Abdominal pain were pertinent to this visit.    Disposition:   Disposition: Discharged  Condition: Stable                        Ramesh Marley MD  01/17/18 6379

## 2018-01-18 ENCOUNTER — ANESTHESIA EVENT (OUTPATIENT)
Dept: SURGERY | Facility: HOSPITAL | Age: 73
End: 2018-01-18
Payer: MEDICARE

## 2018-01-18 ENCOUNTER — ANESTHESIA (OUTPATIENT)
Dept: SURGERY | Facility: HOSPITAL | Age: 73
End: 2018-01-18
Payer: MEDICARE

## 2018-01-18 ENCOUNTER — HOSPITAL ENCOUNTER (OUTPATIENT)
Facility: HOSPITAL | Age: 73
Discharge: HOME | End: 2018-01-18
Attending: SURGERY | Admitting: SURGERY
Payer: MEDICARE

## 2018-01-18 VITALS
RESPIRATION RATE: 20 BRPM | OXYGEN SATURATION: 95 % | DIASTOLIC BLOOD PRESSURE: 79 MMHG | SYSTOLIC BLOOD PRESSURE: 141 MMHG | TEMPERATURE: 98 F | HEART RATE: 88 BPM

## 2018-01-18 DIAGNOSIS — K40.90 HERNIA, INGUINAL, LEFT: Primary | ICD-10-CM

## 2018-01-18 PROCEDURE — 63600175 PHARM REV CODE 636 W HCPCS: Performed by: NURSE ANESTHETIST, CERTIFIED REGISTERED

## 2018-01-18 PROCEDURE — 36000706: Performed by: SURGERY

## 2018-01-18 PROCEDURE — 63600175 PHARM REV CODE 636 W HCPCS: Performed by: ANESTHESIOLOGY

## 2018-01-18 PROCEDURE — 25000242 PHARM REV CODE 250 ALT 637 W/ HCPCS

## 2018-01-18 PROCEDURE — 27200708 HC INTUBATION/EXCHANGE WAND: Performed by: NURSE ANESTHETIST, CERTIFIED REGISTERED

## 2018-01-18 PROCEDURE — 37000008 HC ANESTHESIA 1ST 15 MINUTES: Performed by: SURGERY

## 2018-01-18 PROCEDURE — 71000016 HC POSTOP RECOV ADDL HR: Performed by: SURGERY

## 2018-01-18 PROCEDURE — 71000015 HC POSTOP RECOV 1ST HR: Performed by: SURGERY

## 2018-01-18 PROCEDURE — 25000003 PHARM REV CODE 250: Performed by: NURSE ANESTHETIST, CERTIFIED REGISTERED

## 2018-01-18 PROCEDURE — 25000003 PHARM REV CODE 250: Performed by: SURGERY

## 2018-01-18 PROCEDURE — S0020 INJECTION, BUPIVICAINE HYDRO: HCPCS | Performed by: SURGERY

## 2018-01-18 PROCEDURE — 27201423 OPTIME MED/SURG SUP & DEVICES STERILE SUPPLY: Performed by: SURGERY

## 2018-01-18 PROCEDURE — 25000003 PHARM REV CODE 250: Performed by: ANESTHESIOLOGY

## 2018-01-18 PROCEDURE — D9220A PRA ANESTHESIA: Mod: CRNA,,, | Performed by: NURSE ANESTHETIST, CERTIFIED REGISTERED

## 2018-01-18 PROCEDURE — 71000039 HC RECOVERY, EACH ADD'L HOUR: Performed by: SURGERY

## 2018-01-18 PROCEDURE — 37000009 HC ANESTHESIA EA ADD 15 MINS: Performed by: SURGERY

## 2018-01-18 PROCEDURE — 36000707: Performed by: SURGERY

## 2018-01-18 PROCEDURE — C1781 MESH (IMPLANTABLE): HCPCS | Performed by: SURGERY

## 2018-01-18 PROCEDURE — 71000033 HC RECOVERY, INTIAL HOUR: Performed by: SURGERY

## 2018-01-18 PROCEDURE — D9220A PRA ANESTHESIA: Mod: ANES,,, | Performed by: ANESTHESIOLOGY

## 2018-01-18 DEVICE — MESH PROLENE 3INX6IN 6/BX: Type: IMPLANTABLE DEVICE | Site: INGUINAL | Status: FUNCTIONAL

## 2018-01-18 RX ORDER — NEOSTIGMINE METHYLSULFATE 1 MG/ML
INJECTION, SOLUTION INTRAVENOUS
Status: DISCONTINUED | OUTPATIENT
Start: 2018-01-18 | End: 2018-01-18

## 2018-01-18 RX ORDER — HYDROMORPHONE HYDROCHLORIDE 2 MG/ML
0.2 INJECTION, SOLUTION INTRAMUSCULAR; INTRAVENOUS; SUBCUTANEOUS EVERY 5 MIN PRN
Status: COMPLETED | OUTPATIENT
Start: 2018-01-18 | End: 2018-01-18

## 2018-01-18 RX ORDER — SODIUM CHLORIDE 0.9 G/100ML
IRRIGANT IRRIGATION
Status: DISCONTINUED | OUTPATIENT
Start: 2018-01-18 | End: 2018-01-18 | Stop reason: HOSPADM

## 2018-01-18 RX ORDER — SODIUM CHLORIDE, SODIUM LACTATE, POTASSIUM CHLORIDE, CALCIUM CHLORIDE 600; 310; 30; 20 MG/100ML; MG/100ML; MG/100ML; MG/100ML
INJECTION, SOLUTION INTRAVENOUS CONTINUOUS
Status: DISCONTINUED | OUTPATIENT
Start: 2018-01-18 | End: 2018-01-18 | Stop reason: HOSPADM

## 2018-01-18 RX ORDER — LIDOCAINE HYDROCHLORIDE 10 MG/ML
0.5 INJECTION, SOLUTION EPIDURAL; INFILTRATION; INTRACAUDAL; PERINEURAL ONCE
Status: DISCONTINUED | OUTPATIENT
Start: 2018-01-18 | End: 2018-01-18 | Stop reason: HOSPADM

## 2018-01-18 RX ORDER — ONDANSETRON 2 MG/ML
INJECTION INTRAMUSCULAR; INTRAVENOUS
Status: DISCONTINUED | OUTPATIENT
Start: 2018-01-18 | End: 2018-01-18

## 2018-01-18 RX ORDER — ROCURONIUM BROMIDE 10 MG/ML
INJECTION, SOLUTION INTRAVENOUS
Status: DISCONTINUED | OUTPATIENT
Start: 2018-01-18 | End: 2018-01-18

## 2018-01-18 RX ORDER — METOCLOPRAMIDE HYDROCHLORIDE 5 MG/ML
INJECTION INTRAMUSCULAR; INTRAVENOUS
Status: DISCONTINUED | OUTPATIENT
Start: 2018-01-18 | End: 2018-01-18

## 2018-01-18 RX ORDER — MEPERIDINE HYDROCHLORIDE 50 MG/1
50 TABLET ORAL EVERY 4 HOURS PRN
Status: DISCONTINUED | OUTPATIENT
Start: 2018-01-18 | End: 2018-01-18 | Stop reason: HOSPADM

## 2018-01-18 RX ORDER — LIDOCAINE HCL/PF 100 MG/5ML
SYRINGE (ML) INTRAVENOUS
Status: DISCONTINUED | OUTPATIENT
Start: 2018-01-18 | End: 2018-01-18

## 2018-01-18 RX ORDER — GLYCOPYRROLATE 0.2 MG/ML
INJECTION INTRAMUSCULAR; INTRAVENOUS
Status: DISCONTINUED | OUTPATIENT
Start: 2018-01-18 | End: 2018-01-18

## 2018-01-18 RX ORDER — SUCCINYLCHOLINE CHLORIDE 20 MG/ML
INJECTION INTRAMUSCULAR; INTRAVENOUS
Status: DISCONTINUED | OUTPATIENT
Start: 2018-01-18 | End: 2018-01-18

## 2018-01-18 RX ORDER — HYDROMORPHONE HYDROCHLORIDE 2 MG/ML
0.5 INJECTION, SOLUTION INTRAMUSCULAR; INTRAVENOUS; SUBCUTANEOUS
Status: DISCONTINUED | OUTPATIENT
Start: 2018-01-18 | End: 2018-01-18

## 2018-01-18 RX ORDER — PROPOFOL 10 MG/ML
VIAL (ML) INTRAVENOUS
Status: DISCONTINUED | OUTPATIENT
Start: 2018-01-18 | End: 2018-01-18

## 2018-01-18 RX ORDER — MIDAZOLAM HYDROCHLORIDE 1 MG/ML
INJECTION, SOLUTION INTRAMUSCULAR; INTRAVENOUS
Status: DISCONTINUED | OUTPATIENT
Start: 2018-01-18 | End: 2018-01-18

## 2018-01-18 RX ORDER — KETOROLAC TROMETHAMINE 30 MG/ML
30 INJECTION, SOLUTION INTRAMUSCULAR; INTRAVENOUS ONCE
Status: COMPLETED | OUTPATIENT
Start: 2018-01-18 | End: 2018-01-18

## 2018-01-18 RX ORDER — SODIUM CHLORIDE 0.9 % (FLUSH) 0.9 %
3 SYRINGE (ML) INJECTION
Status: DISCONTINUED | OUTPATIENT
Start: 2018-01-18 | End: 2018-01-18 | Stop reason: HOSPADM

## 2018-01-18 RX ORDER — MEPERIDINE HYDROCHLORIDE 50 MG/ML
25 INJECTION INTRAMUSCULAR; INTRAVENOUS; SUBCUTANEOUS ONCE
Status: COMPLETED | OUTPATIENT
Start: 2018-01-18 | End: 2018-01-18

## 2018-01-18 RX ORDER — BUPIVACAINE HYDROCHLORIDE 5 MG/ML
INJECTION, SOLUTION PERINEURAL
Status: DISCONTINUED | OUTPATIENT
Start: 2018-01-18 | End: 2018-01-18 | Stop reason: HOSPADM

## 2018-01-18 RX ORDER — FENTANYL CITRATE 50 UG/ML
INJECTION, SOLUTION INTRAMUSCULAR; INTRAVENOUS
Status: DISCONTINUED | OUTPATIENT
Start: 2018-01-18 | End: 2018-01-18

## 2018-01-18 RX ORDER — ONDANSETRON 2 MG/ML
4 INJECTION INTRAMUSCULAR; INTRAVENOUS ONCE AS NEEDED
Status: DISCONTINUED | OUTPATIENT
Start: 2018-01-18 | End: 2018-01-18 | Stop reason: HOSPADM

## 2018-01-18 RX ORDER — METOPROLOL TARTRATE 1 MG/ML
INJECTION, SOLUTION INTRAVENOUS
Status: DISCONTINUED | OUTPATIENT
Start: 2018-01-18 | End: 2018-01-18

## 2018-01-18 RX ORDER — ACETAMINOPHEN 10 MG/ML
1000 INJECTION, SOLUTION INTRAVENOUS ONCE
Status: COMPLETED | OUTPATIENT
Start: 2018-01-18 | End: 2018-01-18

## 2018-01-18 RX ORDER — HYDROMORPHONE HYDROCHLORIDE 2 MG/ML
0.5 INJECTION, SOLUTION INTRAMUSCULAR; INTRAVENOUS; SUBCUTANEOUS EVERY 5 MIN PRN
Status: DISCONTINUED | OUTPATIENT
Start: 2018-01-18 | End: 2018-01-18 | Stop reason: HOSPADM

## 2018-01-18 RX ADMIN — ROCURONIUM BROMIDE 25 MG: 10 INJECTION, SOLUTION INTRAVENOUS at 10:01

## 2018-01-18 RX ADMIN — METOCLOPRAMIDE 10 MG: 5 INJECTION, SOLUTION INTRAMUSCULAR; INTRAVENOUS at 09:01

## 2018-01-18 RX ADMIN — HYDROMORPHONE HYDROCHLORIDE 0.2 MG: 2 INJECTION INTRAMUSCULAR; INTRAVENOUS; SUBCUTANEOUS at 12:01

## 2018-01-18 RX ADMIN — RACEPINEPHRINE HYDROCHLORIDE 0.5 ML: 11.25 SOLUTION RESPIRATORY (INHALATION) at 12:01

## 2018-01-18 RX ADMIN — GLYCOPYRROLATE 0.2 MG: 0.2 INJECTION, SOLUTION INTRAMUSCULAR; INTRAVENOUS at 09:01

## 2018-01-18 RX ADMIN — LIDOCAINE HYDROCHLORIDE 100 MG: 20 INJECTION, SOLUTION INTRAVENOUS at 10:01

## 2018-01-18 RX ADMIN — PROPOFOL 40 MG: 10 INJECTION, EMULSION INTRAVENOUS at 10:01

## 2018-01-18 RX ADMIN — SODIUM CHLORIDE, SODIUM LACTATE, POTASSIUM CHLORIDE, AND CALCIUM CHLORIDE: 600; 310; 30; 20 INJECTION, SOLUTION INTRAVENOUS at 08:01

## 2018-01-18 RX ADMIN — GLYCOPYRROLATE 0.6 MG: 0.2 INJECTION, SOLUTION INTRAMUSCULAR; INTRAVENOUS at 11:01

## 2018-01-18 RX ADMIN — HYDROMORPHONE HYDROCHLORIDE 0.5 MG: 2 INJECTION INTRAMUSCULAR; INTRAVENOUS; SUBCUTANEOUS at 01:01

## 2018-01-18 RX ADMIN — FENTANYL CITRATE 100 MCG: 50 INJECTION INTRAMUSCULAR; INTRAVENOUS at 10:01

## 2018-01-18 RX ADMIN — CEFAZOLIN SODIUM 3 G: 1 POWDER, FOR SOLUTION INTRAMUSCULAR; INTRAVENOUS at 10:01

## 2018-01-18 RX ADMIN — ONDANSETRON 4 MG: 2 INJECTION, SOLUTION INTRAMUSCULAR; INTRAVENOUS at 09:01

## 2018-01-18 RX ADMIN — PROPOFOL 160 MG: 10 INJECTION, EMULSION INTRAVENOUS at 10:01

## 2018-01-18 RX ADMIN — HYDROMORPHONE HYDROCHLORIDE 0.2 MG: 2 INJECTION INTRAMUSCULAR; INTRAVENOUS; SUBCUTANEOUS at 01:01

## 2018-01-18 RX ADMIN — SODIUM CHLORIDE, SODIUM LACTATE, POTASSIUM CHLORIDE, AND CALCIUM CHLORIDE: 600; 310; 30; 20 INJECTION, SOLUTION INTRAVENOUS at 11:01

## 2018-01-18 RX ADMIN — KETOROLAC TROMETHAMINE 30 MG: 30 INJECTION, SOLUTION INTRAMUSCULAR at 12:01

## 2018-01-18 RX ADMIN — NEOSTIGMINE METHYLSULFATE 5 MG: 1 INJECTION INTRAVENOUS at 11:01

## 2018-01-18 RX ADMIN — MIDAZOLAM HYDROCHLORIDE 2 MG: 1 INJECTION, SOLUTION INTRAMUSCULAR; INTRAVENOUS at 09:01

## 2018-01-18 RX ADMIN — MEPERIDINE HYDROCHLORIDE 50 MG: 50 TABLET ORAL at 03:01

## 2018-01-18 RX ADMIN — ACETAMINOPHEN 1000 MG: 10 INJECTION, SOLUTION INTRAVENOUS at 12:01

## 2018-01-18 RX ADMIN — METOPROLOL TARTRATE 1 MG: 1 INJECTION, SOLUTION INTRAVENOUS at 10:01

## 2018-01-18 RX ADMIN — ROCURONIUM BROMIDE 5 MG: 10 INJECTION, SOLUTION INTRAVENOUS at 10:01

## 2018-01-18 RX ADMIN — MEPERIDINE HYDROCHLORIDE 25 MG: 50 INJECTION INTRAMUSCULAR; INTRAVENOUS; SUBCUTANEOUS at 12:01

## 2018-01-18 RX ADMIN — SUCCINYLCHOLINE CHLORIDE 140 MG: 20 INJECTION, SOLUTION INTRAMUSCULAR; INTRAVENOUS at 10:01

## 2018-01-18 NOTE — DISCHARGE INSTRUCTIONS
Saray Camacho and [place]  Office # [phone]     Discharge Instructions for Same Day Surgery     Call the office for and appointment if one has not already been made.     Diet: Drink plenty of fluids the first 48 hours and you may resume your   usual diet.     Activity: No heavy lifting (over 10 pounds), pushing or pulling until your   post op visit. Your doctor's office may have told you to limit your lifting to less weight, or even no weight.  Be sure to follow those instructions.    Note: You may ride in a car and you may drive when comfortable.     Do not drive, drink alcohol, or sign legal documents for 24 hours, or if taking narcotic pain medication.    Dressings: Remove the dressing 48 hours after surgery. You may shower no tub bath 48 hours after surgery and you may wash your hair.   Remove pain ball when all the medication is completed the ball is empty.  If you have steri strips ( appears to be strips of white tape) on   your incision, leave them on.     Medical: Call the doctor for any of the following problems: fever above 101,   severe pain, bleeding, or abdominal distention (swelling).   If constipated you may take any stool softener you choose.     MEDICATION: LAST DOSE OF PAIN MEDICATION 3:49 PM     Occasionally small areas of skin numbness or an unpleasant skin sensation can result. Also, you may find that your incision is swollen and tender for a few days.  Some redness around sutures and staples is a normal reaction, but if the discomfort persists or worsens, call you doctor.   Fall Prevention  Millions of people fall every year and injure themselves. You may have had anesthesia or sedation which may increase your risk of falling. You may have health issues that put you at an increased risk of falling.     Here are ways to reduce your risk of falling.  ·   · Make your home safe by keeping walkways clear of objects you may trip over.  · Use non-slip pads under rugs. Do not use area rugs or  small throw rugs.  · Use non-slip mats in bathtubs and showers.  · Install handrails and lights on staircases.  · Do not walk in poorly lit areas.  · Do not stand on chairs or wobbly ladders.  · Use caution when reaching overhead or looking upward. This position can cause a loss of balance.  · Be sure your shoes fit properly, have non-slip bottoms and are in good condition.   · Wear shoes both inside and out. Avoid going barefoot or wearing slippers.  · Be cautious when going up and down stairs, curbs, and when walking on uneven sidewalks.  · If your balance is poor, consider using a cane or walker.  · If your fall was related to alcohol use, stop or limit alcohol intake.   · If your fall was related to use of sleeping medicines, talk to your doctor about this. You may need to reduce your dosage at bedtime if you awaken during the night to go to the bathroom.    · To reduce the need for nighttime bathroom trips:  ¨ Avoid drinking fluids for several hours before going to bed  ¨ Empty your bladder before going to bed  ¨ Men can keep a urinal at the bedside  · Stay as active as you can. Balance, flexibility, strength, and endurance all come from exercise. They all play a role in preventing falls. Ask your healthcare provider which types of activity are right for you.  · Get your vision checked on a regular basis.  · If you have pets, know where they are before you stand up or walk so you don't trip over them.  · Use night lights.

## 2018-01-18 NOTE — INTERVAL H&P NOTE
The patient has been examined and the H&P has been reviewed:    I concur with the findings and no changes have occurred since H&P was written.    Anesthesia/Surgery risks, benefits and alternative options discussed and understood by patient/family.          Active Hospital Problems    Diagnosis  POA    Hernia, inguinal, left [K40.90]  Yes      Resolved Hospital Problems    Diagnosis Date Resolved POA   No resolved problems to display.

## 2018-01-18 NOTE — OP NOTE
DATE OF PROCEDURE:  01/18/2018.    PREOPERATIVE DIAGNOSIS:  Left inguinal hernia.    POSTOPERATIVE DIAGNOSIS:  Left inguinal hernia.    OPERATIVE PROCEDURE:  Left inguinal hernia repair with Prolene mesh.    SURGEON:  Alex Martini M.D.    ASSISTANT:  Dr. Bernie Bourgeois.    PROCEDURE IN DETAIL:  After adequate premedication, the patient was taken to the   Operating Room and placed on the operating table in the supine position.    General anesthesia administered via endotracheal tube.  The lower abdomen and   groin were prepped and draped in the sterile fashion.  A low transverse incision   was made, deepened down through the subcutaneous tissue down through Robert   fascia.  Bleeding controlled for electrocautery.  The external oblique was   opened in direction of its fibers.  Dissection was somewhat difficult because   the patient had a large amount of herniated preperitoneal fat as well as the   patient's habitus; however, the cord and contents were able to be dissected away   from the herniated preperitoneal fat.  The preperitoneal fat dissected to the   point where it could be reduced.  Repair of the defect was then carried out   using Prolene mesh sutured in place with 2-0 Vicryl beginning at the pubic   tubercle extending laterally to the shelving edge of the inguinal ligament,   medially to the transversalis fascia.  Slit was placed in the mesh to encompass   the cord and recreate a new internal ring.  Once this was accomplished, pain   pump inserted.  The external oblique fascia closed with 3-0 Vicryl, subcutaneous   tissue closed with 3-0 Vicryl, skin closed with staples.  Blood loss was   approximately 50 mL.  The patient tolerated the procedure and was transported to   Recovery in stable condition.      OSCAR  dd: 01/18/2018 12:07:10 (CST)  td: 01/18/2018 12:38:15 (CST)  Doc ID   #[id number]  Job ID #[id number]    CC:

## 2018-01-18 NOTE — BRIEF OP NOTE
[facility]  Brief Operative Note     SUMMARY     Surgery Date: 1/18/2018     Surgeon(s) and Role:     * Alex Martini MD - Primary    Assisting Surgeon: None    Pre-op Diagnosis:  Hernia, inguinal, left [K40.90]    Post-op Diagnosis:  Post-Op Diagnosis Codes:     * Hernia, inguinal, left [K40.90]    Procedure(s) (LRB):  REPAIR-HERNIA-INGUINAL-INITIAL (5 YRS+) OPEN (Left)    Anesthesia: General    Description of the findings of the procedure:same  Findings/Key Components: same  Estimated Blood Loss:50ccSpecimens:   Specimen (12h ago through future)    None          Discharge Note    SUMMARY     Admit Date: 1/18/2018    Discharge Date and Time:  01/18/2018 11:57 AM    Hospital Course : sds to home no complicationsFinal Diagnosis: Post-Op Diagnosis Codes:     * Hernia, inguinal, left [K40.90]    Disposition: Home or Self Care    Follow Up/Patient Instructions:     Medications:  Reconciled Home Medications:   Current Discharge Medication List      CONTINUE these medications which have NOT CHANGED    Details   dicyclomine (BENTYL) 20 mg tablet Take 1 tablet (20 mg total) by mouth 2 (two) times daily.  Qty: 20 tablet, Refills: 0      gabapentin (NEURONTIN) 300 MG capsule Take 600 mg by mouth 3 (three) times daily.       lisinopril (PRINIVIL,ZESTRIL) 40 MG tablet Take 40 mg by mouth once daily.      multivitamin, stress formula Tab Take 1 tablet by mouth once daily.      pantoprazole (PROTONIX) 20 MG tablet Take 1 tablet (20 mg total) by mouth once daily.  Qty: 30 tablet, Refills: 0      atorvastatin (LIPITOR) 20 MG tablet Take 1 tablet (20 mg total) by mouth nightly.  Qty: 30 tablet, Refills: 1         STOP taking these medications       acetaminophen (TYLENOL) 500 MG tablet Comments:   Reason for Stopping:         ibuprofen (ADVIL,MOTRIN) 200 MG tablet Comments:   Reason for Stopping:               Discharge Procedure Orders  Diet Adult Regular     Activity as tolerated     Shower on day dressing  removed (No bath)     Notify your health care provider if you experience any of the following:  temperature >100.4     Notify your health care provider if you experience any of the following:  severe uncontrolled pain     Notify your health care provider if you experience any of the following:  redness, tenderness, or signs of infection (pain, swelling, redness, odor or green/yellow discharge around incision site)     Remove dressing in 48 hours       Follow-up Information     Alex Martini MD In 1 week.    Specialty:  General Surgery  Contact information:  [address] [place] [id number] [phone]

## 2018-01-18 NOTE — TRANSFER OF CARE
Anesthesia Transfer of Care Note    Patient: Ramesh Purcell    Procedure(s) Performed: Procedure(s) (LRB):  REPAIR-HERNIA-INGUINAL-INITIAL (5 YRS+) OPEN (Left)    Patient location: PACU    Anesthesia Type: general    Transport from OR: Transported from OR on room air with adequate spontaneous ventilation    Post pain: adequate analgesia    Post assessment: no apparent anesthetic complications and tolerated procedure well    Post vital signs: stable    Level of consciousness: awake, alert and oriented    Nausea/Vomiting: no nausea/vomiting    Complications: none    Transfer of care protocol was followed      Last vitals:   Visit Vitals  BP (!) 148/73 (BP Location: Right arm, Patient Position: Lying)   Pulse 76   Temp 36.8 °C (98.2 °F) (Oral)   Resp 18   SpO2 97%

## 2018-01-19 ENCOUNTER — HOSPITAL ENCOUNTER (EMERGENCY)
Facility: HOSPITAL | Age: 73
Discharge: HOME | End: 2018-01-19
Attending: EMERGENCY MEDICINE
Payer: MEDICARE

## 2018-01-19 VITALS
TEMPERATURE: 98 F | SYSTOLIC BLOOD PRESSURE: 140 MMHG | HEIGHT: 71 IN | OXYGEN SATURATION: 98 % | HEART RATE: 90 BPM | RESPIRATION RATE: 18 BRPM | WEIGHT: 257 LBS | BODY MASS INDEX: 35.98 KG/M2 | DIASTOLIC BLOOD PRESSURE: 75 MMHG

## 2018-01-19 DIAGNOSIS — R33.8 ACUTE RETENTION OF URINE: Primary | ICD-10-CM

## 2018-01-19 LAB
ALBUMIN SERPL BCP-MCNC: 4 G/DL
ALP SERPL-CCNC: 127 U/L
ALT SERPL W/O P-5'-P-CCNC: 33 U/L
ANION GAP SERPL CALC-SCNC: 9 MMOL/L
AST SERPL-CCNC: 27 U/L
BACTERIA #/AREA URNS HPF: NORMAL /HPF
BASOPHILS # BLD AUTO: 0.01 K/UL
BASOPHILS NFR BLD: 0.1 %
BILIRUB SERPL-MCNC: 1.2 MG/DL
BILIRUB UR QL STRIP: NEGATIVE
BUN SERPL-MCNC: 12 MG/DL
CALCIUM SERPL-MCNC: 9.3 MG/DL
CHLORIDE SERPL-SCNC: 103 MMOL/L
CLARITY UR: CLEAR
CO2 SERPL-SCNC: 25 MMOL/L
COLOR UR: ABNORMAL
CREAT SERPL-MCNC: 0.9 MG/DL
DIFFERENTIAL METHOD: ABNORMAL
EOSINOPHIL # BLD AUTO: 0 K/UL
EOSINOPHIL NFR BLD: 0.1 %
ERYTHROCYTE [DISTWIDTH] IN BLOOD BY AUTOMATED COUNT: 12.8 %
EST. GFR  (AFRICAN AMERICAN): >60 ML/MIN/1.73 M^2
EST. GFR  (NON AFRICAN AMERICAN): >60 ML/MIN/1.73 M^2
GLUCOSE SERPL-MCNC: 148 MG/DL
GLUCOSE UR QL STRIP: NEGATIVE
HCT VFR BLD AUTO: 44.3 %
HGB BLD-MCNC: 15.5 G/DL
HGB UR QL STRIP: ABNORMAL
KETONES UR QL STRIP: ABNORMAL
LEUKOCYTE ESTERASE UR QL STRIP: NEGATIVE
LYMPHOCYTES # BLD AUTO: 0.8 K/UL
LYMPHOCYTES NFR BLD: 7.7 %
MCH RBC QN AUTO: 31.6 PG
MCHC RBC AUTO-ENTMCNC: 35 G/DL
MCV RBC AUTO: 90 FL
MICROSCOPIC COMMENT: NORMAL
MONOCYTES # BLD AUTO: 0.9 K/UL
MONOCYTES NFR BLD: 8.6 %
NEUTROPHILS # BLD AUTO: 8.5 K/UL
NEUTROPHILS NFR BLD: 83.5 %
NITRITE UR QL STRIP: NEGATIVE
PH UR STRIP: 7 [PH] (ref 5–8)
PLATELET # BLD AUTO: 203 K/UL
PMV BLD AUTO: 9.6 FL
POTASSIUM SERPL-SCNC: 3.9 MMOL/L
PROT SERPL-MCNC: 7.1 G/DL
PROT UR QL STRIP: NEGATIVE
RBC # BLD AUTO: 4.91 M/UL
RBC #/AREA URNS HPF: 4 /HPF (ref 0–4)
SODIUM SERPL-SCNC: 137 MMOL/L
SP GR UR STRIP: 1.01 (ref 1–1.03)
URN SPEC COLLECT METH UR: ABNORMAL
UROBILINOGEN UR STRIP-ACNC: NEGATIVE EU/DL
WBC # BLD AUTO: 10.19 K/UL
WBC #/AREA URNS HPF: 0 /HPF (ref 0–5)

## 2018-01-19 PROCEDURE — 25000003 PHARM REV CODE 250: Performed by: EMERGENCY MEDICINE

## 2018-01-19 PROCEDURE — 81000 URINALYSIS NONAUTO W/SCOPE: CPT

## 2018-01-19 PROCEDURE — 85025 COMPLETE CBC W/AUTO DIFF WBC: CPT

## 2018-01-19 PROCEDURE — 99284 EMERGENCY DEPT VISIT MOD MDM: CPT | Mod: 25

## 2018-01-19 PROCEDURE — 80053 COMPREHEN METABOLIC PANEL: CPT

## 2018-01-19 PROCEDURE — 51702 INSERT TEMP BLADDER CATH: CPT

## 2018-01-19 RX ORDER — OXYCODONE AND ACETAMINOPHEN 5; 325 MG/1; MG/1
1 TABLET ORAL
Status: COMPLETED | OUTPATIENT
Start: 2018-01-19 | End: 2018-01-19

## 2018-01-19 RX ORDER — MEPERIDINE HYDROCHLORIDE 50 MG/1
50 TABLET ORAL EVERY 4 HOURS PRN
COMMUNITY
End: 2018-01-19 | Stop reason: ALTCHOICE

## 2018-01-19 RX ORDER — PHENAZOPYRIDINE HYDROCHLORIDE 100 MG/1
200 TABLET, FILM COATED ORAL
Status: COMPLETED | OUTPATIENT
Start: 2018-01-19 | End: 2018-01-19

## 2018-01-19 RX ORDER — ONDANSETRON 4 MG/1
4 TABLET, ORALLY DISINTEGRATING ORAL EVERY 6 HOURS PRN
Status: DISCONTINUED | OUTPATIENT
Start: 2018-01-19 | End: 2018-01-19 | Stop reason: HOSPADM

## 2018-01-19 RX ORDER — OXYCODONE AND ACETAMINOPHEN 5; 325 MG/1; MG/1
2 TABLET ORAL EVERY 6 HOURS PRN
Qty: 15 TABLET | Refills: 0 | Status: SHIPPED | OUTPATIENT
Start: 2018-01-19 | End: 2018-07-25 | Stop reason: ALTCHOICE

## 2018-01-19 RX ORDER — ONDANSETRON 4 MG/1
4 TABLET, FILM COATED ORAL EVERY 6 HOURS
Qty: 12 TABLET | Refills: 0 | Status: SHIPPED | OUTPATIENT
Start: 2018-01-19 | End: 2018-07-25 | Stop reason: ALTCHOICE

## 2018-01-19 RX ADMIN — OXYCODONE HYDROCHLORIDE AND ACETAMINOPHEN 1 TABLET: 5; 325 TABLET ORAL at 10:01

## 2018-01-19 RX ADMIN — PHENAZOPYRIDINE HYDROCHLORIDE 200 MG: 100 TABLET ORAL at 12:01

## 2018-01-19 NOTE — ED NOTES
Dr. Souza remains at bedside. Speaking to pt about POC. Assessing pts prostate size and urinary output.

## 2018-01-19 NOTE — ANESTHESIA PREPROCEDURE EVALUATION
01/19/2018  Ramesh Purcell is a 72 y.o., male   Pre-operative evaluation for Procedure(s) (LRB):  REPAIR-HERNIA-INGUINAL-INITIAL (5 YRS+) OPEN (Left)    Ramesh Purcell is a 72 y.o. male     Patient Active Problem List   Diagnosis    Syncope and collapse    Hypertension, essential    At risk for abnormal blood glucose level    Headache    Vasovagal near syncope    Hernia, inguinal, left       Review of patient's allergies indicates:  No Known Allergies    No current facility-administered medications on file prior to encounter.      Current Outpatient Prescriptions on File Prior to Encounter   Medication Sig Dispense Refill    gabapentin (NEURONTIN) 300 MG capsule Take 600 mg by mouth 3 (three) times daily.       lisinopril (PRINIVIL,ZESTRIL) 40 MG tablet Take 40 mg by mouth once daily.      atorvastatin (LIPITOR) 20 MG tablet Take 1 tablet (20 mg total) by mouth nightly. 30 tablet 1       Past Surgical History:   Procedure Laterality Date    CERVICAL SPINE SURGERY      HERNIA REPAIR      SHARPNEL      REMOVED FROM  RIGHT LEG       Social History     Social History    Marital status:      Spouse name: N/A    Number of children: N/A    Years of education: N/A     Occupational History    Not on file.     Social History Main Topics    Smoking status: Never Smoker    Smokeless tobacco: Never Used    Alcohol use No    Drug use: No    Sexual activity: Not on file     Other Topics Concern    Not on file     Social History Narrative    No narrative on file         Vital Signs Range (Last 24H):  Temp:  [36.4 °C (97.6 °F)-37.3 °C (99.1 °F)]   Pulse:  []   Resp:  [18-20]   BP: (140-170)/(74-90)   SpO2:  [95 %-98 %]       CBC:   Recent Labs      01/19/18   0956   WBC  10.19   RBC  4.91   HGB  15.5   HCT  44.3   PLT  203   MCV  90   MCH  31.6*   MCHC  35.0        CMP:   Recent Labs      01/19/18   0956   NA  137   K  3.9   CL  103   CO2  25   BUN  12   CREATININE  0.9   GLU  148*   CALCIUM  9.3   ALBUMIN  4.0   PROT  7.1   ALKPHOS  127   ALT  33   AST  27   BILITOT  1.2*       Anesthesia Evaluation    I have reviewed the Patient Summary Reports.     I have reviewed the Medications.     Review of Systems  Anesthesia Hx:  No problems with previous Anesthesia Denies Hx of Anesthetic complications  History of prior surgery of interest to airway management or planning: Denies Family Hx of Anesthesia complications.   Denies Personal Hx of Anesthesia complications.   Hematology/Oncology:  Hematology Normal   Oncology Normal     EENT/Dental:EENT/Dental Normal   Cardiovascular:   Exercise tolerance: good Hypertension Vasovagal syncope   Pulmonary:  Pulmonary Normal    Renal/:   renal calculi    Hepatic/GI:  Hepatic/GI Normal    Neurological:   Neuromuscular Disease, Headaches    Endocrine:  Endocrine Normal    Psych:  Psychiatric Normal           Physical Exam  General:  Obesity    Airway/Jaw/Neck:  Airway Findings: Mouth Opening: Normal Tongue: Normal  General Airway Assessment: Adult, Average  Mallampati: III  TM Distance: Normal, at least 6 cm  Jaw/Neck Findings:  Neck ROM: Normal ROM      Dental:  Dental Findings: In tact   Chest/Lungs:  Chest/Lungs Findings: Normal Respiratory Rate, Clear to auscultation     Heart/Vascular:  Heart Findings: Rate: Normal  Rhythm: Regular Rhythm        Mental Status:  Mental Status Findings:  Alert and Oriented, Cooperative         Anesthesia Plan  Type of Anesthesia, risks & benefits discussed:  Anesthesia Type:  general  Patient's Preference:   Intra-op Monitoring Plan: standard ASA monitors  Intra-op Monitoring Plan Comments:   Post Op Pain Control Plan: multimodal analgesia and IV/PO Opioids PRN  Post Op Pain Control Plan Comments:   Induction:   IV  Beta Blocker:  Patient is not currently on a Beta-Blocker (No further documentation  required).       Informed Consent: Patient understands risks and agrees with Anesthesia plan.  Questions answered. Anesthesia consent signed with patient.  ASA Score: 2     Day of Surgery Review of History & Physical:    H&P update referred to the surgeon.         Ready For Surgery From Anesthesia Perspective.

## 2018-01-19 NOTE — PROGRESS NOTES
Bladder scan   Showed sl more than 500 ml      Cbc  Nl      ua pending          Pain at presentation was 8 and now is 4 / 10      Would like to try a percocet to see if he can tolerate it and before I change the demerol to percocet.

## 2018-01-19 NOTE — ANESTHESIA POSTPROCEDURE EVALUATION
Anesthesia Post Evaluation    Patient: Ramesh Purcell    Procedure(s) Performed: Procedure(s) (LRB):  REPAIR-HERNIA-INGUINAL-INITIAL (5 YRS+) OPEN (Left)    Final Anesthesia Type: general  Patient location during evaluation: PACU  Patient participation: Yes- Able to Participate  Level of consciousness: awake and alert and oriented  Post-procedure vital signs: reviewed and stable  Pain management: adequate  Airway patency: patent  PONV status at discharge: No PONV  Anesthetic complications: no      Cardiovascular status: blood pressure returned to baseline, hemodynamically stable and hypertensive  Respiratory status: unassisted, spontaneous ventilation and room air  Hydration status: euvolemic  Follow-up not needed.        Visit Vitals  BP (!) 141/79   Pulse 88   Temp 36.5 °C (97.7 °F) (Oral)   Resp 20   SpO2 95%       Pain/Naina Score: Pain Assessment Performed: Yes (1/18/2018  2:01 PM)  Presence of Pain: complains of pain/discomfort (1/19/2018  9:02 AM)  Pain Rating Prior to Med Admin: 4 (1/19/2018 10:42 AM)  Pain Rating Post Med Admin: 2 (1/18/2018  4:45 PM)  Naina Score: 10 (1/18/2018  2:01 PM)  Modified Naina Score: 19 (1/18/2018  4:45 PM)

## 2018-01-19 NOTE — ED TRIAGE NOTES
"Pt arrived to ED via EMS from home for c/o "dripping urine". Pt states he has one full urination yesterday after the hernia repair. Pt states "i have the urgency to urinate but sometimes it just drips." pt has been urinating in a pitcher. Urine sample collected. REU. AAO x 4. In no acute distress.   "

## 2018-06-20 ENCOUNTER — HOSPITAL ENCOUNTER (EMERGENCY)
Facility: HOSPITAL | Age: 73
Discharge: HOME | End: 2018-06-20
Attending: EMERGENCY MEDICINE
Payer: MEDICARE

## 2018-06-20 VITALS
SYSTOLIC BLOOD PRESSURE: 150 MMHG | WEIGHT: 278 LBS | HEART RATE: 70 BPM | TEMPERATURE: 99 F | HEIGHT: 71 IN | OXYGEN SATURATION: 97 % | DIASTOLIC BLOOD PRESSURE: 90 MMHG | RESPIRATION RATE: 18 BRPM | BODY MASS INDEX: 38.92 KG/M2

## 2018-06-20 DIAGNOSIS — R73.9 HYPERGLYCEMIA: ICD-10-CM

## 2018-06-20 DIAGNOSIS — R10.13 ABDOMINAL PAIN, EPIGASTRIC: ICD-10-CM

## 2018-06-20 DIAGNOSIS — K29.70 GASTRITIS WITHOUT BLEEDING, UNSPECIFIED CHRONICITY, UNSPECIFIED GASTRITIS TYPE: Primary | ICD-10-CM

## 2018-06-20 LAB
ALBUMIN SERPL BCP-MCNC: 3.8 G/DL
ALP SERPL-CCNC: 114 U/L
ALT SERPL W/O P-5'-P-CCNC: 29 U/L
ANION GAP SERPL CALC-SCNC: 8 MMOL/L
AST SERPL-CCNC: 25 U/L
BASOPHILS # BLD AUTO: 0.04 K/UL
BASOPHILS NFR BLD: 0.6 %
BILIRUB SERPL-MCNC: 0.7 MG/DL
BILIRUB UR QL STRIP: NEGATIVE
BUN SERPL-MCNC: 17 MG/DL
CALCIUM SERPL-MCNC: 9.4 MG/DL
CHLORIDE SERPL-SCNC: 108 MMOL/L
CLARITY UR: CLEAR
CO2 SERPL-SCNC: 22 MMOL/L
COLOR UR: ABNORMAL
CREAT SERPL-MCNC: 0.9 MG/DL
DIFFERENTIAL METHOD: ABNORMAL
EOSINOPHIL # BLD AUTO: 0.2 K/UL
EOSINOPHIL NFR BLD: 2.6 %
ERYTHROCYTE [DISTWIDTH] IN BLOOD BY AUTOMATED COUNT: 12.8 %
EST. GFR  (AFRICAN AMERICAN): >60 ML/MIN/1.73 M^2
EST. GFR  (NON AFRICAN AMERICAN): >60 ML/MIN/1.73 M^2
GLUCOSE SERPL-MCNC: 199 MG/DL
GLUCOSE UR QL STRIP: ABNORMAL
HCT VFR BLD AUTO: 45.5 %
HGB BLD-MCNC: 15.8 G/DL
HGB UR QL STRIP: NEGATIVE
KETONES UR QL STRIP: NEGATIVE
LEUKOCYTE ESTERASE UR QL STRIP: NEGATIVE
LIPASE SERPL-CCNC: 29 U/L
LYMPHOCYTES # BLD AUTO: 0.9 K/UL
LYMPHOCYTES NFR BLD: 13.4 %
MCH RBC QN AUTO: 30.4 PG
MCHC RBC AUTO-ENTMCNC: 34.7 G/DL
MCV RBC AUTO: 88 FL
MONOCYTES # BLD AUTO: 0.5 K/UL
MONOCYTES NFR BLD: 7.6 %
NEUTROPHILS # BLD AUTO: 5.1 K/UL
NEUTROPHILS NFR BLD: 75.4 %
NITRITE UR QL STRIP: NEGATIVE
PH UR STRIP: 7 [PH] (ref 5–8)
PLATELET # BLD AUTO: 130 K/UL
PMV BLD AUTO: 9.9 FL
POTASSIUM SERPL-SCNC: 4.1 MMOL/L
PROT SERPL-MCNC: 6.9 G/DL
PROT UR QL STRIP: NEGATIVE
RBC # BLD AUTO: 5.2 M/UL
SODIUM SERPL-SCNC: 138 MMOL/L
SP GR UR STRIP: 1.01 (ref 1–1.03)
URN SPEC COLLECT METH UR: ABNORMAL
UROBILINOGEN UR STRIP-ACNC: NEGATIVE EU/DL
WBC # BLD AUTO: 6.81 K/UL

## 2018-06-20 PROCEDURE — 80053 COMPREHEN METABOLIC PANEL: CPT

## 2018-06-20 PROCEDURE — 83690 ASSAY OF LIPASE: CPT

## 2018-06-20 PROCEDURE — 81003 URINALYSIS AUTO W/O SCOPE: CPT

## 2018-06-20 PROCEDURE — 99283 EMERGENCY DEPT VISIT LOW MDM: CPT

## 2018-06-20 PROCEDURE — 25000003 PHARM REV CODE 250: Performed by: EMERGENCY MEDICINE

## 2018-06-20 PROCEDURE — 85025 COMPLETE CBC W/AUTO DIFF WBC: CPT

## 2018-06-20 RX ORDER — SUCRALFATE 1 G/1
1 TABLET ORAL
Qty: 40 TABLET | Refills: 0 | Status: SHIPPED | OUTPATIENT
Start: 2018-06-20 | End: 2018-06-30

## 2018-06-20 RX ORDER — SUCRALFATE 1 G/1
1 TABLET ORAL
Qty: 40 TABLET | Refills: 0 | Status: SHIPPED | OUTPATIENT
Start: 2018-06-20 | End: 2018-06-20

## 2018-06-20 RX ORDER — OMEPRAZOLE 40 MG/1
40 CAPSULE, DELAYED RELEASE ORAL DAILY
COMMUNITY
End: 2018-07-25 | Stop reason: ALTCHOICE

## 2018-06-20 RX ADMIN — LIDOCAINE HYDROCHLORIDE: 20 SOLUTION ORAL; TOPICAL at 09:06

## 2018-06-20 NOTE — ED TRIAGE NOTES
Pt comes in with complaints of abdominal pain which has been intermittent for a couple months but has worsened the past 2 days. Pt denies dysuria. Pt reports nausea. Pt denies vomiting. Pt reports last BM 2 days ago. Pt is passing flatulence.

## 2018-06-20 NOTE — ED PROVIDER NOTES
Encounter Date: 6/20/2018    SCRIBE #1 NOTE: I, Gabby Grady, am scribing for, and in the presence of, Giorgio Finn MD.       History     Chief Complaint   Patient presents with    Abdominal Pain    Nausea     CC: Abdominal Pain    HPI: This 72 y.o male who has Arthritis, Hypertension, Kidney stones, and Peripheral neuropathy presents to the ED c/o acute, intermittent, 6/10 epigastric abdominal pain radiating to his lower abdomen for the past month.  Patient also reports of associated nausea and dry heaving.  Patient reports coming to the ED today for his symptoms after they worsened last night, in which he describes the pain as becoming excruciating.  Patient reports informing his PCP of his abdominal pain and reports having lab work done, but reports his symptoms have not resolved.  Patient reports he is currently taking omeprazole daily and reports last taking it this morning.  Patient reports his last bowel movement 2 days ago and denies any black/bloody stools.  Patient denies fever, chills, chest pain, cough, rhinorrhea, shortness of breath, back pain, or any other associated symptoms.   No alleviating factors.      The history is provided by the patient. No  was used.     Review of patient's allergies indicates:  No Known Allergies  Past Medical History:   Diagnosis Date    Arthritis     Hypertension     Kidney stones     Peripheral neuropathy      Past Surgical History:   Procedure Laterality Date    CERVICAL SPINE SURGERY      HERNIA REPAIR Left     inguinal    SHARPNEL      REMOVED FROM  RIGHT LEG     Family History   Problem Relation Age of Onset    Breast cancer Mother     Colon cancer Mother     Heart disease Father 72        MI     Social History   Substance Use Topics    Smoking status: Never Smoker    Smokeless tobacco: Never Used    Alcohol use No     Review of Systems   Constitutional: Negative for chills and fever.   HENT: Negative for ear pain and sore  throat.    Eyes: Negative for pain.   Respiratory: Negative for cough and shortness of breath.    Cardiovascular: Negative for chest pain.   Gastrointestinal: Positive for abdominal pain and nausea. Negative for blood in stool, constipation, diarrhea and vomiting.   Genitourinary: Negative for dysuria.   Musculoskeletal: Negative for back pain.   Skin: Negative for rash.   Neurological: Negative for headaches.       Physical Exam     Initial Vitals [06/20/18 0859]   BP Pulse Resp Temp SpO2   (!) 166/85 90 20 98.4 °F (36.9 °C) 96 %      MAP       --         Physical Exam    Nursing note and vitals reviewed.  Constitutional: Vital signs are normal. He appears well-developed and well-nourished. He is active.  Non-toxic appearance. No distress.   HENT:   Head: Normocephalic and atraumatic.   Eyes: EOM are normal.   Neck: Trachea normal. Neck supple.   Cardiovascular: Normal rate and regular rhythm.   Pulmonary/Chest: Breath sounds normal. No respiratory distress.   Abdominal: Soft. Normal appearance and bowel sounds are normal. He exhibits no distension. There is tenderness (mild) in the epigastric area.   Musculoskeletal: Normal range of motion. He exhibits no edema.   Neurological: He is alert.   Skin: Skin is warm, dry and intact.   Psychiatric: He has a normal mood and affect.         ED Course   Procedures  Labs Reviewed   CBC W/ AUTO DIFFERENTIAL - Abnormal; Notable for the following:        Result Value    Platelets 130 (*)     Lymph # 0.9 (*)     Gran% 75.4 (*)     Lymph% 13.4 (*)     All other components within normal limits   COMPREHENSIVE METABOLIC PANEL - Abnormal; Notable for the following:     CO2 22 (*)     Glucose 199 (*)     All other components within normal limits   URINALYSIS - Abnormal; Notable for the following:     Glucose, UA 2+ (*)     All other components within normal limits   LIPASE         Admission on 06/20/2018   Component Date Value Ref Range Status    WBC 06/20/2018 6.81  3.90 - 12.70  K/uL Final    RBC 06/20/2018 5.20  4.60 - 6.20 M/uL Final    Hemoglobin 06/20/2018 15.8  14.0 - 18.0 g/dL Final    Hematocrit 06/20/2018 45.5  40.0 - 54.0 % Final    MCV 06/20/2018 88  82 - 98 fL Final    MCH 06/20/2018 30.4  27.0 - 31.0 pg Final    MCHC 06/20/2018 34.7  32.0 - 36.0 g/dL Final    RDW 06/20/2018 12.8  11.5 - 14.5 % Final    Platelets 06/20/2018 130* 150 - 350 K/uL Final    MPV 06/20/2018 9.9  9.2 - 12.9 fL Final    Gran # (ANC) 06/20/2018 5.1  1.8 - 7.7 K/uL Final    Lymph # 06/20/2018 0.9* 1.0 - 4.8 K/uL Final    Mono # 06/20/2018 0.5  0.3 - 1.0 K/uL Final    Eos # 06/20/2018 0.2  0.0 - 0.5 K/uL Final    Baso # 06/20/2018 0.04  0.00 - 0.20 K/uL Final    Gran% 06/20/2018 75.4* 38.0 - 73.0 % Final    Lymph% 06/20/2018 13.4* 18.0 - 48.0 % Final    Mono% 06/20/2018 7.6  4.0 - 15.0 % Final    Eosinophil% 06/20/2018 2.6  0.0 - 8.0 % Final    Basophil% 06/20/2018 0.6  0.0 - 1.9 % Final    Differential Method 06/20/2018 Automated   Final    Sodium 06/20/2018 138  136 - 145 mmol/L Final    Potassium 06/20/2018 4.1  3.5 - 5.1 mmol/L Final    Specimen slightly hemolyzed    Chloride 06/20/2018 108  95 - 110 mmol/L Final    CO2 06/20/2018 22* 23 - 29 mmol/L Final    Glucose 06/20/2018 199* 70 - 110 mg/dL Final    BUN, Bld 06/20/2018 17  8 - 23 mg/dL Final    Creatinine 06/20/2018 0.9  0.5 - 1.4 mg/dL Final    Calcium 06/20/2018 9.4  8.7 - 10.5 mg/dL Final    Total Protein 06/20/2018 6.9  6.0 - 8.4 g/dL Final    Albumin 06/20/2018 3.8  3.5 - 5.2 g/dL Final    Total Bilirubin 06/20/2018 0.7  0.1 - 1.0 mg/dL Final    Comment: For infants and newborns, interpretation of results should be based  on gestational age, weight and in agreement with clinical  observations.  Premature Infant recommended reference ranges:  Up to 24 hours.............<8.0 mg/dL  Up to 48 hours............<12.0 mg/dL  3-5 days..................<15.0 mg/dL  6-29 days.................<15.0 mg/dL      Alkaline  Phosphatase 06/20/2018 114  55 - 135 U/L Final    AST 06/20/2018 25  10 - 40 U/L Final    ALT 06/20/2018 29  10 - 44 U/L Final    Anion Gap 06/20/2018 8  8 - 16 mmol/L Final    eGFR if African American 06/20/2018 >60  >60 mL/min/1.73 m^2 Final    eGFR if non African American 06/20/2018 >60  >60 mL/min/1.73 m^2 Final    Comment: Calculation used to obtain the estimated glomerular filtration  rate (eGFR) is the CKD-EPI equation.       Lipase 06/20/2018 29  4 - 60 U/L Final    Specimen UA 06/20/2018 Urine, Clean Catch   Final    Color, UA 06/20/2018 Straw  Yellow, Straw, [place] Final    Appearance, UA 06/20/2018 Clear  Clear Final    pH, UA 06/20/2018 7.0  5.0 - 8.0 Final    Specific Gravity, UA 06/20/2018 1.010  1.005 - 1.030 Final    Protein, UA 06/20/2018 Negative  Negative Final    Comment: Recommend a 24 hour urine protein or a urine   protein/creatinine ratio if globulin induced proteinuria is  clinically suspected.      Glucose, UA 06/20/2018 2+* Negative Final    Ketones, UA 06/20/2018 Negative  Negative Final    Bilirubin (UA) 06/20/2018 Negative  Negative Final    Occult Blood UA 06/20/2018 Negative  Negative Final    Nitrite, UA 06/20/2018 Negative  Negative Final    Urobilinogen, UA 06/20/2018 Negative  <2.0 EU/dL Final    Leukocytes, UA 06/20/2018 Negative  Negative Final   ]      Imaging Results    None          Medical Decision Making:   History:   Old Medical Records: I decided to obtain old medical records.  Initial Assessment:   72 y.o male who has Arthritis, Hypertension, Kidney stones, and Peripheral neuropathy presents to the ED c/o acute, intermittent, 6/10 epigastric abdominal pain radiating to his lower abdomen for the past month.  Patient also reports of associated nausea and dry heaving. On exam, the patient has mild epigastric tenderness.  Heart sounds normal. Lung sounds normal.   Clinical Tests:   Lab Tests: Ordered and Reviewed  ED Management:  While in the ED, the  patient will receive a GI cocktail for his symptoms.  Labs are pending at this time.  Will reassess.  Disposition is pending results and reassessment.      1040 Patient pain free after GI cocktail.  Labs unremarkable except for hyperglycemia.  Will recommend have rechecked as outpt.  Patient likely has gastritis vs ulcer.  Will start on carafate and recommend outpt GI follow up.            Scribe Attestation:   Scribe #1: I performed the above scribed service and the documentation accurately describes the services I performed. I attest to the accuracy of the note.    Attending Attestation:           Physician Attestation for Scribe:  Physician Attestation Statement for Scribe #1: I, Giorgio Finn MD, reviewed documentation, as scribed by Gabby Grady in my presence, and it is both accurate and complete.                    Clinical Impression:   The primary encounter diagnosis was Gastritis without bleeding, unspecified chronicity, unspecified gastritis type. Diagnoses of Abdominal pain, epigastric and Hyperglycemia were also pertinent to this visit.                             Giorgio Finn MD  06/20/18 1050

## 2018-07-25 ENCOUNTER — HOSPITAL ENCOUNTER (EMERGENCY)
Facility: HOSPITAL | Age: 73
Discharge: HOME | End: 2018-07-25
Attending: EMERGENCY MEDICINE
Payer: MEDICARE

## 2018-07-25 VITALS
BODY MASS INDEX: 36.4 KG/M2 | DIASTOLIC BLOOD PRESSURE: 73 MMHG | RESPIRATION RATE: 18 BRPM | SYSTOLIC BLOOD PRESSURE: 136 MMHG | TEMPERATURE: 98 F | HEART RATE: 74 BPM | WEIGHT: 260 LBS | OXYGEN SATURATION: 95 % | HEIGHT: 71 IN

## 2018-07-25 DIAGNOSIS — R10.13 RECURRENT EPIGASTRIC ABDOMINAL PAIN: Primary | ICD-10-CM

## 2018-07-25 DIAGNOSIS — R10.9 ABDOMINAL PAIN: ICD-10-CM

## 2018-07-25 DIAGNOSIS — K40.91 RECURRENT INGUINAL HERNIA OF LEFT SIDE WITHOUT OBSTRUCTION OR GANGRENE: ICD-10-CM

## 2018-07-25 DIAGNOSIS — R10.32 ABDOMINAL PAIN, ACUTE, LEFT LOWER QUADRANT: ICD-10-CM

## 2018-07-25 LAB
ALBUMIN SERPL BCP-MCNC: 4.2 G/DL
ALP SERPL-CCNC: 108 U/L
ALT SERPL W/O P-5'-P-CCNC: 32 U/L
ANION GAP SERPL CALC-SCNC: 12 MMOL/L
AST SERPL-CCNC: 33 U/L
BASOPHILS # BLD AUTO: 0.03 K/UL
BASOPHILS NFR BLD: 0.4 %
BILIRUB SERPL-MCNC: 1 MG/DL
BILIRUB UR QL STRIP: NEGATIVE
BUN SERPL-MCNC: 14 MG/DL
CALCIUM SERPL-MCNC: 10.1 MG/DL
CHLORIDE SERPL-SCNC: 106 MMOL/L
CLARITY UR: CLEAR
CO2 SERPL-SCNC: 21 MMOL/L
COLOR UR: YELLOW
CREAT SERPL-MCNC: 1 MG/DL
DIFFERENTIAL METHOD: ABNORMAL
EOSINOPHIL # BLD AUTO: 0 K/UL
EOSINOPHIL NFR BLD: 0.6 %
ERYTHROCYTE [DISTWIDTH] IN BLOOD BY AUTOMATED COUNT: 13.1 %
EST. GFR  (AFRICAN AMERICAN): >60 ML/MIN/1.73 M^2
EST. GFR  (NON AFRICAN AMERICAN): >60 ML/MIN/1.73 M^2
GLUCOSE SERPL-MCNC: 130 MG/DL
GLUCOSE UR QL STRIP: NEGATIVE
HCT VFR BLD AUTO: 46 %
HGB BLD-MCNC: 16.5 G/DL
HGB UR QL STRIP: NEGATIVE
KETONES UR QL STRIP: ABNORMAL
LEUKOCYTE ESTERASE UR QL STRIP: NEGATIVE
LIPASE SERPL-CCNC: 18 U/L
LYMPHOCYTES # BLD AUTO: 1.4 K/UL
LYMPHOCYTES NFR BLD: 19.9 %
MCH RBC QN AUTO: 31.3 PG
MCHC RBC AUTO-ENTMCNC: 35.9 G/DL
MCV RBC AUTO: 87 FL
MONOCYTES # BLD AUTO: 0.6 K/UL
MONOCYTES NFR BLD: 8.1 %
NEUTROPHILS # BLD AUTO: 4.8 K/UL
NEUTROPHILS NFR BLD: 71 %
NITRITE UR QL STRIP: NEGATIVE
PH UR STRIP: 8 [PH] (ref 5–8)
PLATELET # BLD AUTO: 187 K/UL
PMV BLD AUTO: 10 FL
POTASSIUM SERPL-SCNC: 4.3 MMOL/L
PROT SERPL-MCNC: 7.6 G/DL
PROT UR QL STRIP: NEGATIVE
RBC # BLD AUTO: 5.28 M/UL
SODIUM SERPL-SCNC: 139 MMOL/L
SP GR UR STRIP: 1.01 (ref 1–1.03)
URN SPEC COLLECT METH UR: ABNORMAL
UROBILINOGEN UR STRIP-ACNC: NEGATIVE EU/DL
WBC # BLD AUTO: 6.79 K/UL

## 2018-07-25 PROCEDURE — 93005 ELECTROCARDIOGRAM TRACING: CPT

## 2018-07-25 PROCEDURE — 81003 URINALYSIS AUTO W/O SCOPE: CPT

## 2018-07-25 PROCEDURE — C9113 INJ PANTOPRAZOLE SODIUM, VIA: HCPCS | Performed by: EMERGENCY MEDICINE

## 2018-07-25 PROCEDURE — 80053 COMPREHEN METABOLIC PANEL: CPT

## 2018-07-25 PROCEDURE — 25000003 PHARM REV CODE 250: Performed by: EMERGENCY MEDICINE

## 2018-07-25 PROCEDURE — 99285 EMERGENCY DEPT VISIT HI MDM: CPT | Mod: 25

## 2018-07-25 PROCEDURE — 63600175 PHARM REV CODE 636 W HCPCS: Performed by: EMERGENCY MEDICINE

## 2018-07-25 PROCEDURE — 83690 ASSAY OF LIPASE: CPT

## 2018-07-25 PROCEDURE — 96376 TX/PRO/DX INJ SAME DRUG ADON: CPT

## 2018-07-25 PROCEDURE — S0028 INJECTION, FAMOTIDINE, 20 MG: HCPCS | Performed by: EMERGENCY MEDICINE

## 2018-07-25 PROCEDURE — 96375 TX/PRO/DX INJ NEW DRUG ADDON: CPT

## 2018-07-25 PROCEDURE — 93010 ELECTROCARDIOGRAM REPORT: CPT | Mod: ,,, | Performed by: INTERNAL MEDICINE

## 2018-07-25 PROCEDURE — 96366 THER/PROPH/DIAG IV INF ADDON: CPT

## 2018-07-25 PROCEDURE — 85025 COMPLETE CBC W/AUTO DIFF WBC: CPT

## 2018-07-25 PROCEDURE — 96365 THER/PROPH/DIAG IV INF INIT: CPT

## 2018-07-25 RX ORDER — SUCRALFATE 1 G/1
1 TABLET ORAL 4 TIMES DAILY
COMMUNITY

## 2018-07-25 RX ORDER — FAMOTIDINE 10 MG/ML
20 INJECTION INTRAVENOUS
Status: COMPLETED | OUTPATIENT
Start: 2018-07-25 | End: 2018-07-25

## 2018-07-25 RX ORDER — PROMETHAZINE HYDROCHLORIDE 25 MG/1
25 TABLET ORAL EVERY 6 HOURS PRN
Qty: 20 TABLET | Refills: 2 | Status: SHIPPED | OUTPATIENT
Start: 2018-07-25

## 2018-07-25 RX ORDER — MORPHINE SULFATE 4 MG/ML
4 INJECTION, SOLUTION INTRAMUSCULAR; INTRAVENOUS
Status: COMPLETED | OUTPATIENT
Start: 2018-07-25 | End: 2018-07-25

## 2018-07-25 RX ORDER — HYDROCODONE BITARTRATE AND ACETAMINOPHEN 5; 325 MG/1; MG/1
1 TABLET ORAL EVERY 4 HOURS PRN
Qty: 15 TABLET | Refills: 0 | Status: SHIPPED | OUTPATIENT
Start: 2018-07-25 | End: 2018-08-04

## 2018-07-25 RX ORDER — ONDANSETRON 4 MG/1
4 TABLET, FILM COATED ORAL EVERY 8 HOURS PRN
Qty: 12 TABLET | Refills: 2 | Status: SHIPPED | OUTPATIENT
Start: 2018-07-25

## 2018-07-25 RX ORDER — ONDANSETRON 2 MG/ML
4 INJECTION INTRAMUSCULAR; INTRAVENOUS
Status: COMPLETED | OUTPATIENT
Start: 2018-07-25 | End: 2018-07-25

## 2018-07-25 RX ORDER — PANTOPRAZOLE SODIUM 40 MG/1
TABLET, DELAYED RELEASE ORAL
Qty: 30 TABLET | Refills: 0 | Status: SHIPPED | OUTPATIENT
Start: 2018-07-25

## 2018-07-25 RX ORDER — MAG HYDROX/ALUMINUM HYD/SIMETH 200-200-20
60 SUSPENSION, ORAL (FINAL DOSE FORM) ORAL
Status: COMPLETED | OUTPATIENT
Start: 2018-07-25 | End: 2018-07-25

## 2018-07-25 RX ADMIN — PANTOPRAZOLE SODIUM 40 MG: 40 INJECTION, POWDER, LYOPHILIZED, FOR SOLUTION INTRAVENOUS at 07:07

## 2018-07-25 RX ADMIN — SODIUM CHLORIDE 1000 ML: 0.9 INJECTION, SOLUTION INTRAVENOUS at 07:07

## 2018-07-25 RX ADMIN — ALUMINUM HYDROXIDE, MAGNESIUM HYDROXIDE, AND SIMETHICONE 60 ML: 200; 200; 20 SUSPENSION ORAL at 07:07

## 2018-07-25 RX ADMIN — FAMOTIDINE 20 MG: 10 INJECTION INTRAVENOUS at 05:07

## 2018-07-25 RX ADMIN — MORPHINE SULFATE 4 MG: 4 INJECTION INTRAVENOUS at 09:07

## 2018-07-25 RX ADMIN — MORPHINE SULFATE 4 MG: 4 INJECTION INTRAVENOUS at 07:07

## 2018-07-25 RX ADMIN — ONDANSETRON HYDROCHLORIDE 4 MG: 2 INJECTION, SOLUTION INTRAMUSCULAR; INTRAVENOUS at 05:07

## 2018-07-25 NOTE — ED TRIAGE NOTES
Pt states that he had an EGD done recently and was given medications to help with the pain. States that this afternoon, the pain started to get worse and he started to dry heave. Pt points to epigastric region and runs his finger down the middle of his stomach when asked where the pain is at. Pt reports nausea. States that pain is 4/10 in abdomen and a 6/10 in the groin. PT states that during one of his dry heaving episodes he felt something pop in the left groin. PT states that he had a hernia surgery in January on that side.

## 2018-07-25 NOTE — CONSULTS
[facility]        General Surgery Consult Note    07/25/2018        Patient: Ramesh Purcell 72 y.o.  MRN: [id number]  Admit Date: 7/14/2018  LOS: 0      HPI: Ramesh Purcell is a 72 y.o. M with known gastritis undergoing workup with GI team, previous left inguinal hernia repair in January by Dr. Martini, presents to the ER with symptoms of pain to the left groin.   He has been dry heaving quite frequently due to his gastritis and during one episode felt a pop and pain to left groin. He has been having normal BMs and passing gas. He notices a bulge when he stands that he is able to reduce himself.          Past Medical History:   Diagnosis Date    Arthritis     GERD (gastroesophageal reflux disease)     Hypertension     Kidney stones     Peripheral neuropathy        Past Surgical History:   Procedure Laterality Date    CERVICAL SPINE SURGERY      HERNIA REPAIR Left     inguinal    SHARPNEL      REMOVED FROM  RIGHT LEG       Family History   Problem Relation Age of Onset    Breast cancer Mother     Colon cancer Mother     Heart disease Father 72        MI       Social History     Social History    Marital status:      Spouse name: N/A    Number of children: N/A    Years of education: N/A     Occupational History    Not on file.     Social History Main Topics    Smoking status: Never Smoker    Smokeless tobacco: Never Used    Alcohol use No    Drug use: No    Sexual activity: Not on file     Other Topics Concern    Not on file     Social History Narrative    No narrative on file                 Objective                                                                                                          Vitals:    07/25/18 0852   BP: (!) 140/77   Pulse: 84   Resp:    Temp:        Recent Labs      07/25/18   0530   WBC  6.79   HGB  16.5   HCT  46.0   PLT  187        Recent Labs      07/25/18   0530   NA  139   K  4.3   CL  106   CO2  21*    BUN  14   CREATININE  1.0   GLU  130*        Imaging Results          CT Abdomen Pelvis  Without Contrast (Final result)  Result time 07/25/18 08:45:11    Final result by Dustin Abreu II, MD (07/25/18 08:45:11)                 Impression:      1. Enlarging left inguinal hernia which now contains fat and part of the sigmoid colon.  There is new inflammatory change within and around the hernia sac.  No evidence of obstruction.  2. See above for additional findings, which are similar to prior exam.      Electronically signed by: Dustin Abreu  Date:    07/25/2018  Time:    08:45             Narrative:    EXAMINATION:  CT ABDOMEN PELVIS WITHOUT CONTRAST    CLINICAL HISTORY:  Abdominal pain, unspecified;    TECHNIQUE:  Low dose axial images, sagittal and coronal reformations were obtained from the lung bases to the pubic symphysis.  No intravenous or oral contrast was administered.    COMPARISON:  CT abdomen pelvis 01/16/2018    FINDINGS:  Visualized chest:    Coronary artery calcifications are present.  Stable punctate left posterior pleural calcification.    Abdomen and pelvis:    The liver, gallbladder, spleen, pancreas, and adrenal glands are normal.  Left renal pelviectasis and a left lower pole cyst measuring 1.4 cm are not significantly changed.  Subcentimeter hypodensities in the left upper pole and right mid pole visible on prior exam are not well seen on today's non-contrast exam.  No abnormally dilated loops of bowel to suggest obstruction.  No suspicious focal bowel wall thickening.  There is a moderately sized left inguinal hernia which contains fat and part of the proximal sigmoid colon.  There is moderate fat stranding within and around this hernia sac.  The sac size, inflammation, and bowel within inner new since prior exam.  A small fat containing inguinal hernia is unchanged.    No mesenteric or retroperitoneal lymphadenopathy.  Mild aortoiliac atherosclerotic calcification.  The urinary bladder  is grossly normal.  The prostate is mildly enlarged and contains a few dystrophic calcifications.  No free air or free fluid.    Bones and soft tissues:    Bilateral hip joint osteoarthrosis. Multilevel degenerative changes of the visualized spine and severe lower lumbar facet arthrosis.  Soft tissues are grossly normal.                               X-Ray Abdomen Flat And Erect (Final result)  Result time 07/25/18 06:52:44    Final result by Cortes Whitlock MD (07/25/18 06:52:44)                 Impression:      Nonobstructive bowel gas pattern.      Electronically signed by: Cortes Whitlock MD  Date:    07/25/2018  Time:    06:52             Narrative:    EXAMINATION:  XR ABDOMEN FLAT AND ERECT    CLINICAL HISTORY:  Abdominal Pain;    TECHNIQUE:  Flat and erect AP views of the abdomen were performed.    COMPARISON:  01/16/2018    FINDINGS:  Scattered air is seen in nondilated loops of small and large bowel. No central small bowel air fluid levels are appreciated.  No definite evidence of free intraperitoneal air.  There are degenerative changes of the visualized lumbar spine and bilateral hips.  The visualized lung bases appear clear.                                    PHYSICAL EXAM:    Physical Exam   Constitutional: He is oriented to person, place, and time and well-developed, well-nourished, and in no distress. No distress.   HENT:   Head: Normocephalic and atraumatic.   Eyes: Pupils are equal, round, and reactive to light.   Cardiovascular: Normal rate and regular rhythm.    Pulmonary/Chest: Effort normal and breath sounds normal.   Abdominal: He exhibits no distension. There is no tenderness. There is no rebound and no guarding.   Left lower groin incision well healed. There is minor tenderness upon palpation of the hernia and is easily reducible.    Neurological: He is alert and oriented to person, place, and time. GCS score is 15.   Skin: Skin is warm. He is not diaphoretic.   Psychiatric: Affect normal.              Radiology:  Imaging Results          CT Abdomen Pelvis  Without Contrast (Final result)  Result time 07/25/18 08:45:11    Final result by Dustin Abreu II, MD (07/25/18 08:45:11)                 Impression:      1. Enlarging left inguinal hernia which now contains fat and part of the sigmoid colon.  There is new inflammatory change within and around the hernia sac.  No evidence of obstruction.  2. See above for additional findings, which are similar to prior exam.      Electronically signed by: Dustin Abreu  Date:    07/25/2018  Time:    08:45             Narrative:    EXAMINATION:  CT ABDOMEN PELVIS WITHOUT CONTRAST    CLINICAL HISTORY:  Abdominal pain, unspecified;    TECHNIQUE:  Low dose axial images, sagittal and coronal reformations were obtained from the lung bases to the pubic symphysis.  No intravenous or oral contrast was administered.    COMPARISON:  CT abdomen pelvis 01/16/2018    FINDINGS:  Visualized chest:    Coronary artery calcifications are present.  Stable punctate left posterior pleural calcification.    Abdomen and pelvis:    The liver, gallbladder, spleen, pancreas, and adrenal glands are normal.  Left renal pelviectasis and a left lower pole cyst measuring 1.4 cm are not significantly changed.  Subcentimeter hypodensities in the left upper pole and right mid pole visible on prior exam are not well seen on today's non-contrast exam.  No abnormally dilated loops of bowel to suggest obstruction.  No suspicious focal bowel wall thickening.  There is a moderately sized left inguinal hernia which contains fat and part of the proximal sigmoid colon.  There is moderate fat stranding within and around this hernia sac.  The sac size, inflammation, and bowel within inner new since prior exam.  A small fat containing inguinal hernia is unchanged.    No mesenteric or retroperitoneal lymphadenopathy.  Mild aortoiliac atherosclerotic calcification.  The urinary bladder is grossly normal.   The prostate is mildly enlarged and contains a few dystrophic calcifications.  No free air or free fluid.    Bones and soft tissues:    Bilateral hip joint osteoarthrosis. Multilevel degenerative changes of the visualized spine and severe lower lumbar facet arthrosis.  Soft tissues are grossly normal.                               X-Ray Abdomen Flat And Erect (Final result)  Result time 07/25/18 06:52:44    Final result by Cortes Whitlock MD (07/25/18 06:52:44)                 Impression:      Nonobstructive bowel gas pattern.      Electronically signed by: Cortes Whitlock MD  Date:    07/25/2018  Time:    06:52             Narrative:    EXAMINATION:  XR ABDOMEN FLAT AND ERECT    CLINICAL HISTORY:  Abdominal Pain;    TECHNIQUE:  Flat and erect AP views of the abdomen were performed.    COMPARISON:  01/16/2018    FINDINGS:  Scattered air is seen in nondilated loops of small and large bowel. No central small bowel air fluid levels are appreciated.  No definite evidence of free intraperitoneal air.  There are degenerative changes of the visualized lumbar spine and bilateral hips.  The visualized lung bases appear clear.                                    Assessment / Plan:                                                                                           A/P: 72 y.o. male with hx of gastritis and recurrent dry heaving now with recurrent inguinal hernia.   - hernia is reducible. No signs of obstruction on exam or CT  - He can follow up in clinic and we can evaluate him for repair of recurrent hernia   - optimally his GI symptoms will be controlled prior to repair.       Taye Betancourt MD  PGY5 [place] General Surgery

## 2018-07-25 NOTE — ED PROVIDER NOTES
"Encounter Date: 7/25/2018    SCRIBE #1 NOTE: I, Hernán Claire, am scribing for, and in the presence of,  Portillo Harrell MD. I have scribed the following portions of the note - Other sections scribed: HPI, ROS.       History     Chief Complaint   Patient presents with    Abdominal Pain     Pt reports having epigastic pain with dry heaves. Pt stated he felt something in groin "pop" while dry heaving. Pt stated he recently had an EGD and was told he has an inflammed stomach.    Dizziness     CC: Abdominal Pain    HPI: This is a 72 y.o. M who has HTN, Peripheral neuropathy, Arthritis, GERD, and Hx of Kidney stones who presents to the ED c/o acute and moderate abdominal pain that began today. He notes pain in the side when urinating. Pt has associated 1 day history of nausea without vomiting. He also reports 1 episode of diarrhea yesterday evening and 4 episodes of normal BM yesterday as well. Pt had 3 previous episodes of abdominal pain, in which he had an Endoscopy 7/3/2018 and "inflammed stomach" was detected. He was prescribed medication after the Endoscopy, which he states alleviated the abdominal pain at that time. Pt also states that he had a hernia repair 6 months ago. He has been dry heaving and states that it feels like "something has popped out of my abdomen." Pt denies fever, chills, ear pain, eye pain, cough, SOB, CP, vomiting, back pain, dysuria, arm or leg problem, rash, tobacco use, or alcohol use.      The history is provided by the patient. No  was used.     Review of patient's allergies indicates:  No Known Allergies  Past Medical History:   Diagnosis Date    Arthritis     GERD (gastroesophageal reflux disease)     Hypertension     Kidney stones     Peripheral neuropathy      Past Surgical History:   Procedure Laterality Date    CERVICAL SPINE SURGERY      HERNIA REPAIR Left     inguinal    SHARPNEL      REMOVED FROM  RIGHT LEG     Family History   Problem Relation Age of " Onset    Breast cancer Mother     Colon cancer Mother     Heart disease Father 72        MI     Social History   Substance Use Topics    Smoking status: Never Smoker    Smokeless tobacco: Never Used    Alcohol use No     Review of Systems   Constitutional: Negative for chills and fever.   HENT: Negative for ear pain and sore throat.    Eyes: Negative for pain.   Respiratory: Negative for cough and shortness of breath.    Cardiovascular: Negative for chest pain.   Gastrointestinal: Positive for abdominal pain, diarrhea (1 episode) and nausea. Negative for vomiting.   Genitourinary: Negative for dysuria.   Musculoskeletal: Negative for back pain.        (-) arm or leg problems   Skin: Negative for rash.   Neurological: Negative for headaches.       Physical Exam     Initial Vitals [07/25/18 0513]   BP Pulse Resp Temp SpO2   (!) 142/89 88 (!) 22 97.5 °F (36.4 °C) 100 %      MAP       --         Physical Exam  The patient was examined specifically for the following:   General:No significant distress, Good color, Warm and dry. Head and neck:Scalp atraumatic, Neck supple. Neurological:Appropriate conversation, Gross motor deficits. Eyes:Conjugate gaze, Clear corneas. ENT: No epistaxis. Cardiac: Regular rate and rhythm, Grossly normal heart tones. Pulmonary: Wheezing, Rales. Gastrointestinal: Abdominal tenderness, Abdominal distention. Musculoskeletal: Extremity deformity, Apparent pain with range of motion of the joints. Skin: Rash.   The findings on examination were normal except for the following:  The patient has some fullness in the left lower quadrant the subcutaneous tissue.  The patient is tender there.  There is mild epigastric tenderness.  There is no guarding rebound mass or distention.  Lungs are clear.  The heart tones are normal. There is no abdominal distention.  The patient seems very nervous.  ED Course   Procedures  Labs Reviewed   CBC W/ AUTO DIFFERENTIAL - Abnormal; Notable for the following:         Result Value    MCH 31.3 (*)     All other components within normal limits   COMPREHENSIVE METABOLIC PANEL - Abnormal; Notable for the following:     CO2 21 (*)     Glucose 130 (*)     All other components within normal limits   URINALYSIS, REFLEX TO URINE CULTURE - Abnormal; Notable for the following:     Ketones, UA 1+ (*)     All other components within normal limits    Narrative:     Preferred Collection Type->Urine, Clean Catch   LIPASE     EKG Readings: (Independently Interpreted)   This patient is in a normal sinus rhythm with a heart rate of 78.  The p.r. QRS and QT intervals are normal. There are some nonspecific ST segment and T-wave changes.  There is no definite evidence of acute myocardial infarction or malignant arrhythmia.  The       Imaging Results          CT Abdomen Pelvis  Without Contrast (Final result)  Result time 07/25/18 08:45:11    Final result by Dustin Abreu II, MD (07/25/18 08:45:11)                 Impression:      1. Enlarging left inguinal hernia which now contains fat and part of the sigmoid colon.  There is new inflammatory change within and around the hernia sac.  No evidence of obstruction.  2. See above for additional findings, which are similar to prior exam.      Electronically signed by: Dustin Abreu  Date:    07/25/2018  Time:    08:45             Narrative:    EXAMINATION:  CT ABDOMEN PELVIS WITHOUT CONTRAST    CLINICAL HISTORY:  Abdominal pain, unspecified;    TECHNIQUE:  Low dose axial images, sagittal and coronal reformations were obtained from the lung bases to the pubic symphysis.  No intravenous or oral contrast was administered.    COMPARISON:  CT abdomen pelvis 01/16/2018    FINDINGS:  Visualized chest:    Coronary artery calcifications are present.  Stable punctate left posterior pleural calcification.    Abdomen and pelvis:    The liver, gallbladder, spleen, pancreas, and adrenal glands are normal.  Left renal pelviectasis and a left lower pole cyst  measuring 1.4 cm are not significantly changed.  Subcentimeter hypodensities in the left upper pole and right mid pole visible on prior exam are not well seen on today's non-contrast exam.  No abnormally dilated loops of bowel to suggest obstruction.  No suspicious focal bowel wall thickening.  There is a moderately sized left inguinal hernia which contains fat and part of the proximal sigmoid colon.  There is moderate fat stranding within and around this hernia sac.  The sac size, inflammation, and bowel within inner new since prior exam.  A small fat containing inguinal hernia is unchanged.    No mesenteric or retroperitoneal lymphadenopathy.  Mild aortoiliac atherosclerotic calcification.  The urinary bladder is grossly normal.  The prostate is mildly enlarged and contains a few dystrophic calcifications.  No free air or free fluid.    Bones and soft tissues:    Bilateral hip joint osteoarthrosis. Multilevel degenerative changes of the visualized spine and severe lower lumbar facet arthrosis.  Soft tissues are grossly normal.                               X-Ray Abdomen Flat And Erect (Final result)  Result time 07/25/18 06:52:44    Final result by Cortes Whitlock MD (07/25/18 06:52:44)                 Impression:      Nonobstructive bowel gas pattern.      Electronically signed by: Cortes Whitlock MD  Date:    07/25/2018  Time:    06:52             Narrative:    EXAMINATION:  XR ABDOMEN FLAT AND ERECT    CLINICAL HISTORY:  Abdominal Pain;    TECHNIQUE:  Flat and erect AP views of the abdomen were performed.    COMPARISON:  01/16/2018    FINDINGS:  Scattered air is seen in nondilated loops of small and large bowel. No central small bowel air fluid levels are appreciated.  No definite evidence of free intraperitoneal air.  There are degenerative changes of the visualized lumbar spine and bilateral hips.  The visualized lung bases appear clear.                              Medical decision making:  Given the  above, this patient presents to the emergency room with epigastric discomfort and left lower quadrant abdominal pain.  The patient has a normal laboratory evaluation.  CT reveals enlargement of the inguinal hernia on the left.  This was previously repaired.  There are new changes.  The patient was evaluated by General surgery, Dr. Betancourt, who feels that the patient be discharged clinic.  I will sure that this patient is on maximal proton pump inhibitor therapy.  I will discharge the patient on medicine for pain. I will have him follow up with General surgery as instructed.  I will treat the patient for nausea.                Scribe Attestation:   Scribe #1: I performed the above scribed service and the documentation accurately describes the services I performed. I attest to the accuracy of the note.    Attending Attestation:           Physician Attestation for Scribe:  Physician Attestation Statement for Scribe #1: I, Portillo Harrell MD, reviewed documentation, as scribed by Hernán Claire in my presence, and it is both accurate and complete.                    Clinical Impression:   The primary encounter diagnosis was Recurrent epigastric abdominal pain. Diagnoses of Abdominal pain, Abdominal pain, acute, left lower quadrant, and Recurrent inguinal hernia of left side without obstruction or gangrene were also pertinent to this visit.                             Portillo Harrell MD  07/25/18 1080       Portillo Harrell MD  07/25/18 101

## 2018-10-25 ENCOUNTER — TELEPHONE (OUTPATIENT)
Dept: SURGERY | Facility: CLINIC | Age: 73
End: 2018-10-25

## 2021-03-02 NOTE — ED PROVIDER NOTES
"Encounter Date: 1/19/2018    SCRIBE #1 NOTE: I, Ana Scott, am scribing for, and in the presence of,  Shital Souza MD. I have scribed the following portions of the note - Other sections scribed: HPI and ROS.       History     Chief Complaint   Patient presents with    Urinary Incontinence     Pt reports urinary incontinence and full bladder feeling starting yesterday after hernia repair surgery     CC: Urinary Incontinence and Urinary Retention    HPI: This 72 y.o. Male with PMHx of HTN, kidney stones, peripheral neuropathy, and arthritis and PSHx of cervical spine surgery, shrapnel, and hernia repair presents to the ED c/o urinary incontinence and urinary retention that began yesterday subsequent to a hernia repair surgery. Pt states he feels urinary urgency then he involuntarily urinates. Pt states he continues to feel like his bladder is full after he urinates. Pt reports decrease in appetite. Pt adds he has not eaten in the past 2 days. Pt reports flatulence and an inability to flatulate without "straining." Pt last took his pain medicine approximately 7 hours ago. Pt also took 2 "baby" Aspirins for pain. Pt states the pain does not improve with his prescribed pain medicine. Pt denies a hx of similar symptoms. Pt also denies a hx of prostate gland problems. In addition, pt denies fever, chills, nausea, vomiting, diarrhea, dysuria, and any other associated symptoms.      The history is provided by the patient and the spouse. No  was used.     Review of patient's allergies indicates:  No Known Allergies  Past Medical History:   Diagnosis Date    Arthritis     Hypertension     Kidney stones     Peripheral neuropathy      Past Surgical History:   Procedure Laterality Date    CERVICAL SPINE SURGERY      HERNIA REPAIR      SHARPNEL      REMOVED FROM  RIGHT LEG     Family History   Problem Relation Age of Onset    Breast cancer Mother     Colon cancer Mother     Heart disease Father " - - - "72     MI     Social History   Substance Use Topics    Smoking status: Never Smoker    Smokeless tobacco: Never Used    Alcohol use No     Review of Systems   Constitutional: Positive for appetite change (decrease). Negative for chills, diaphoresis and fever.   HENT: Negative for ear pain.    Eyes: Negative for pain.   Respiratory: Negative for cough and shortness of breath.    Cardiovascular: Negative for chest pain.   Gastrointestinal: Negative for abdominal pain, diarrhea, nausea and vomiting.        (+) An inability to flatulate without "straining."   Genitourinary: Positive for urgency. Negative for dysuria.        (+) Urinary incontinence and urinary retention.    Musculoskeletal: Negative for back pain.   Skin: Negative for rash.   Neurological: Negative for headaches.       Physical Exam     Initial Vitals [01/19/18 0858]   BP Pulse Resp Temp SpO2   (!) 170/90 110 18 99.1 °F (37.3 °C) 98 %      MAP       116.67         Physical Exam    Constitutional: He appears well-developed and well-nourished.   HENT:   Nose: Nose normal.   Mouth/Throat: Oropharynx is clear and moist.   Eyes: EOM are normal. Pupils are equal, round, and reactive to light.   Neck:   Sp crushing injury to c - spine 2008 with some chronic pain on movement   (baseline complaint today)    Cardiovascular: Normal rate, regular rhythm and normal heart sounds.   Pulmonary/Chest: Breath sounds normal.   Abdominal: Soft. Bowel sounds are normal. He exhibits no distension and no mass. There is tenderness. There is no rebound and no guarding.   Tender primarily over bladder.     Pain device in place      Dressing over incision site has what amounts to just a few drops of blood on it.    No malodor.    Perineal bruising as expected        Genitourinary: Penis normal.   Genitourinary Comments: Rectal exam        Prostate sl generous     No boggy and no significant tenderness to the prostate.     Musculoskeletal: He exhibits no edema.   Neurological: " He is alert and oriented to person, place, and time.   Skin: Skin is warm and dry. Capillary refill takes less than 2 seconds.   Psychiatric: He has a normal mood and affect.         ED Course   Procedures  Labs Reviewed - No data to display                     Scribe Attestation:   Scribe #1: I performed the above scribed service and the documentation accurately describes the services I performed. I attest to the accuracy of the note.    Attending Attestation:           Physician Attestation for Scribe:  Physician Attestation Statement for Scribe #1: I, Shital Souza MD, reviewed documentation, as scribed by Ana Scott in my presence, and it is both accurate and complete.                 ED Course      Clinical Impression:       Pt had left inguinal hernia repair yest by Dr Martini---op note reviewed.   Prolene mesh.    50 ml blood loss     No noted complications    Had very little to eat wed and virtually nothing yest and is concerned about just small bm (but very little solid intake)     Home about noon yest and last night had trouble emptying his bladder. No dysuria    No fever   Sl nause but no emesis.    Was rx demerol which he last took at 3a.    He had desired percocet with he has taken without problem even though codeine caused itching.      Pain seems mostly related to the bladder area.          1140    Pt insists on having gómez removed although he understands that he may need to return to have it replaced.    He has had no problem with the percocet from allergy standpoint and will rx one or two every 4-6 hours as needed instead of demerol.    Will provide zofran odt and pt will begin regular miralax while on analgesic.                      Shital Souza MD  01/19/18 1442       Shital Souza MD  01/19/18 2036       Shital Souza MD  01/20/18 0812

## (undated) DEVICE — CLOSURE SKIN STERI STRIP 1/2X4

## (undated) DEVICE — BLANKET UPPER BODY 78.7X29.9IN

## (undated) DEVICE — DRAIN PENROSE XRAY 18 X 1/4 ST

## (undated) DEVICE — DRESSING TRANS 2X2 TEGADERM

## (undated) DEVICE — SEE L#120831

## (undated) DEVICE — SPONGE GAUZE 16PLY 4X4

## (undated) DEVICE — KIT ON-Q PAIN FIX 6.5MM 100ML

## (undated) DEVICE — STAPLER SKIN PROXIMATE WIDE

## (undated) DEVICE — SUT CTD VICRYL 3-0 CR/SH

## (undated) DEVICE — SUT 2/0 18IN COATED VICRYL

## (undated) DEVICE — GAUZE SPONGE 4X4 12PLY

## (undated) DEVICE — GLOVE BIOGEL ECLIPSE SZ 7.5

## (undated) DEVICE — SUT CTD VICRYL 0 UND BR

## (undated) DEVICE — SEE MEDLINE ITEM 157117

## (undated) DEVICE — Device

## (undated) DEVICE — CHLORAPREP W TINT 26ML APPL

## (undated) DEVICE — COVER OVERHEAD SURG LT BLUE

## (undated) DEVICE — SUT CTD VICRYL BR CR/SH VIL

## (undated) DEVICE — ELECTRODE REM PLYHSV RETURN 9

## (undated) DEVICE — SUT 2/0 36IN COATED VICRYL